# Patient Record
Sex: MALE | Race: BLACK OR AFRICAN AMERICAN | Employment: OTHER | URBAN - METROPOLITAN AREA
[De-identification: names, ages, dates, MRNs, and addresses within clinical notes are randomized per-mention and may not be internally consistent; named-entity substitution may affect disease eponyms.]

---

## 2019-06-05 ENCOUNTER — APPOINTMENT (OUTPATIENT)
Dept: GENERAL RADIOLOGY | Age: 69
End: 2019-06-05
Attending: EMERGENCY MEDICINE
Payer: MEDICARE

## 2019-06-05 ENCOUNTER — HOSPITAL ENCOUNTER (EMERGENCY)
Age: 69
Discharge: HOME | End: 2019-06-05
Attending: EMERGENCY MEDICINE
Payer: MEDICARE

## 2019-06-05 VITALS
SYSTOLIC BLOOD PRESSURE: 167 MMHG | RESPIRATION RATE: 18 BRPM | OXYGEN SATURATION: 98 % | BODY MASS INDEX: 28 KG/M2 | HEART RATE: 88 BPM | WEIGHT: 200 LBS | TEMPERATURE: 98.2 F | DIASTOLIC BLOOD PRESSURE: 94 MMHG | HEIGHT: 71 IN

## 2019-06-05 DIAGNOSIS — M79.675 GREAT TOE PAIN, LEFT: Primary | ICD-10-CM

## 2019-06-05 DIAGNOSIS — R03.0 ELEVATED BLOOD PRESSURE READING: ICD-10-CM

## 2019-06-05 DIAGNOSIS — S93.509A TOE SPRAIN, INITIAL ENCOUNTER: ICD-10-CM

## 2019-06-05 DIAGNOSIS — F17.200 NICOTINE USE DISORDER: ICD-10-CM

## 2019-06-05 DIAGNOSIS — S99.922A INJURY OF GREAT TOE OF LEFT FOOT, INITIAL ENCOUNTER: ICD-10-CM

## 2019-06-05 PROCEDURE — 74011250637 HC RX REV CODE- 250/637: Performed by: EMERGENCY MEDICINE

## 2019-06-05 PROCEDURE — 99283 EMERGENCY DEPT VISIT LOW MDM: CPT

## 2019-06-05 PROCEDURE — 77030036687 HC SHOE PSTOP S2SG -A

## 2019-06-05 PROCEDURE — 73630 X-RAY EXAM OF FOOT: CPT

## 2019-06-05 RX ORDER — BISMUTH SUBSALICYLATE 262 MG
1 TABLET,CHEWABLE ORAL DAILY
COMMUNITY
End: 2020-09-13

## 2019-06-05 RX ORDER — NAPROXEN 375 MG/1
375 TABLET ORAL 2 TIMES DAILY WITH MEALS
Qty: 10 TAB | Refills: 0 | Status: SHIPPED | OUTPATIENT
Start: 2019-06-05 | End: 2019-06-10

## 2019-06-05 RX ORDER — HYDROCODONE BITARTRATE AND ACETAMINOPHEN 5; 325 MG/1; MG/1
1 TABLET ORAL ONCE
Status: COMPLETED | OUTPATIENT
Start: 2019-06-05 | End: 2019-06-05

## 2019-06-05 RX ORDER — NAPROXEN 250 MG/1
500 TABLET ORAL
Status: COMPLETED | OUTPATIENT
Start: 2019-06-05 | End: 2019-06-05

## 2019-06-05 RX ORDER — TRAMADOL HYDROCHLORIDE 50 MG/1
50 TABLET ORAL
Qty: 12 TAB | Refills: 0 | Status: SHIPPED | OUTPATIENT
Start: 2019-06-05 | End: 2019-06-08

## 2019-06-05 RX ADMIN — NAPROXEN 500 MG: 250 TABLET ORAL at 08:24

## 2019-06-05 RX ADMIN — HYDROCODONE BITARTRATE AND ACETAMINOPHEN 1 TABLET: 5; 325 TABLET ORAL at 08:25

## 2019-06-05 NOTE — ED PROVIDER NOTES
EMERGENCY DEPARTMENT HISTORY AND PHYSICAL EXAM    7:18 AM      Date: 6/5/2019  Patient Name: Yvonne Farah    History of Presenting Illness     Chief Complaint   Patient presents with    Toe Pain         History Provided By: Patient    Additional History (Context): Yvonne Farah is a 80 y.o. male with Patient states has no past medical problems who presents with chief complaint of left great toe pain for 5 days. He states the pain is moderate. He states that after he hit the toe there was just minimal pain but the pain got increasingly worse. He reports hitting the toe on a cabinet about 5 days ago and is been having some limping since then. States the pain is 6 7 out of 10. He denies any history of gout, and also denies weakness, numbness, dizziness, shortness of breath, calf pain, other foot pain, and no other injury. No other complaints. He does report that he has no PCP and would like a referral.    PCP: None        Past History     Past Medical History:  Denies  Past Surgical History:  Denies  Family History:  History reviewed. No pertinent family history. Social History:  Social History     Tobacco Use    Smoking status: Current Every Day Smoker    Tobacco comment: 3 cig a day   Substance Use Topics    Alcohol use: Yes     Comment: 1/2 pint every 2-3 days    Drug use: Never       Allergies:  No Known Allergies      Review of Systems       Review of Systems   Constitutional: Negative for chills and fever. Eyes: Negative for visual disturbance. Respiratory: Negative for shortness of breath. Cardiovascular: Negative for chest pain and leg swelling. Gastrointestinal: Negative for nausea and vomiting. Musculoskeletal: Positive for arthralgias and gait problem (.  Limping). Left great toe pain   Skin: Negative for color change, pallor and rash. Neurological: Negative for dizziness, weakness and numbness. Hematological: Does not bruise/bleed easily.    Psychiatric/Behavioral: Negative for confusion. All other systems reviewed and are negative. Physical Exam     Visit Vitals  BP (!) 167/94 (BP 1 Location: Left arm, BP Patient Position: Sitting)   Pulse 88   Temp 98.2 °F (36.8 °C)   Resp 18   Ht 5' 11\" (1.803 m)   Wt 90.7 kg (200 lb)   SpO2 98%   BMI 27.89 kg/m²         Physical Exam   Constitutional: He is oriented to person, place, and time. He appears well-developed and well-nourished. No distress. HENT:   Head: Normocephalic and atraumatic. Eyes: Conjunctivae are normal.   Neck: Normal range of motion. Cardiovascular: Normal rate and intact distal pulses. Pulmonary/Chest: Effort normal. No respiratory distress. Musculoskeletal: Normal range of motion. He exhibits tenderness. He exhibits no edema. Tenderness left great toe  Some mild swelling of the toe  No erythema  No open wound  Sensation intact, can wiggle the toe but with pain limitation  No other foot or toe tenderness  No tib-fib tenderness  Good dorsalis pedal pulses  Sensation intact  Cap refill less than 2 seconds     Neurological: He is alert and oriented to person, place, and time. No cranial nerve deficit. He exhibits normal muscle tone. Coordination normal.   Skin: Skin is warm and dry. He is not diaphoretic. No erythema. No pallor. Psychiatric: He has a normal mood and affect. His behavior is normal.   Nursing note and vitals reviewed. Diagnostic Study Results     Labs -  No results found for this or any previous visit (from the past 12 hour(s)). Radiologic Studies -   XR FOOT LT MIN 3 V   Final Result   IMPRESSION:        1. No acute fracture or subluxation. 2.  Osteoarthrosis at the 1st metatarsophalangeal joint and the 1st sesamoid and   metatarsal joint. 3.  No definite erosive arthropathy. Medical Decision Making   I am the first provider for this patient.     I reviewed the vital signs, available nursing notes, past medical history, past surgical history, family history and social history. Vital Signs-Reviewed the patient's vital signs. Records Reviewed: Nursing Notes and Old Medical Records (Time of Review: 7:18 AM)    Provider Notes (Medical Decision Making): DDX: Fracture, sprain, internal derangement, gout, arthritis    Consistent with injury related  X-ray, pain medication      MDM    Medications   HYDROcodone-acetaminophen (NORCO) 5-325 mg per tablet 1 Tab (has no administration in time range)   naproxen (NAPROSYN) tablet 500 mg (has no administration in time range)           ED Course: Progress Notes, Reevaluation, and Consults:  I discussed patient's elevated blood pressure which could be associated with pain extremis. Denies any history of hypertension, but does not have a PCP and has not seen a doctor in a long time. He is asymptomatic, no dizziness, no chest pain no blurred vision. We will give him a PCP referral also podiatrist referral.    I discussed risk of nicotine use disorder including MI, stroke, cancer, and advised him to quit smoking. Patient states that he does not believe he has a problem with smoking, although does smoke daily. Advised him to discuss further with PCP referral.  Patient understands and agrees    I have reassessed the patient. I have discussed the workup, results and plan with the patient and patient is in agreement. Patient is feeling better. Patient will be prescribed tramadol, Naprosyn. Patient was discharge in stable condition. Patient was given outpatient follow up. Patient is to return to emergency department if any new or worsening condition. Diagnosis     Clinical Impression:   1. Great toe pain, left    2. Injury of great toe of left foot, initial encounter    3. Toe sprain, initial encounter    4. Elevated blood pressure reading    5.  Nicotine use disorder        Disposition: Discharged    Follow-up Information     Follow up With Specialties Details Why Contact Info    Elizabeth Castellano DPM Podiatry Schedule an appointment as soon as possible for a visit in 1 day  [address] [facility] [address] [facility]  Schedule an appointment as soon as possible for a visit in 2 days Get repeat blood pressure check [address]  [address] [facility]    [id number] [place] [address] EMERGENCY DEPT Emergency Medicine  As needed, If symptoms worsen [address] [facility]  [phone]           Patient's Medications   Start Taking    NAPROXEN (NAPROSYN) 375 MG TABLET    Take 1 Tab by mouth two (2) times daily (with meals) for 5 days. For pain    TRAMADOL (ULTRAM) 50 MG TABLET    Take 1 Tab by mouth every six (6) hours as needed for Pain for up to 3 days. Max Daily Amount: 200 mg. Indications: Pain   Continue Taking    MULTIVITAMIN (ONE A DAY) TABLET    Take 1 Tab by mouth daily. These Medications have changed    No medications on file   Stop Taking    No medications on file         DO Elodia Mac medical dictation software was used for portions of this report. Unintended transcription errors may occur. My signature above authenticates this document and my orders, the final    diagnosis (es), discharge prescription (s), and instructions in the Epic    record.

## 2019-06-05 NOTE — DISCHARGE INSTRUCTIONS
If you were prescribed any medication take as directed. Do not drive or use heavy equipment if prescribed narcotics. Follow up with your primary care physician or with specialist as directed. Return to the emergency room with any new or worsening conditions. Foot Sprain: Care Instructions  Your Care Instructions    A foot sprain occurs when you stretch or tear the ligaments around your foot. Ligaments are the tough tissues that connect one bone to another. A sprain can happen when you run, fall, or hit your toe against something. Sprains often happen when you jump or change direction quickly. This may occur when you play basketball, soccer, or other sports. Most foot sprains will get better with treatment at home. Follow-up care is a key part of your treatment and safety. Be sure to make and go to all appointments, and call your doctor if you are having problems. It's also a good idea to know your test results and keep a list of the medicines you take. How can you care for yourself at home? · Walk or put weight on your sprained foot as long as it does not hurt. · If your doctor gave you a splint or immobilizer, wear it as directed. If you were given crutches, use them as directed. · For the first 2 days after your injury, avoid hot showers, hot tubs, or hot packs. They may increase swelling. · Put ice or a cold pack on your foot for 10 to 20 minutes at a time to stop swelling. Try this every 1 to 2 hours for 3 days (when you are awake) or until the swelling goes down. Put a thin cloth between the ice pack and your skin. Keep your splint dry. · After 2 or 3 days, if your swelling is gone, put a heating pad (set on low) or a warm cloth on your foot. Some doctors suggest that you go back and forth between hot and cold treatments. · Prop up your foot on a pillow when you ice it or anytime you sit or lie down. Try to keep it above the level of your heart. This will help reduce swelling.   · Take pain medicines exactly as directed. ? If the doctor gave you a prescription medicine for pain, take it as prescribed. ? If you are not taking a prescription pain medicine, ask your doctor if you can take an over-the-counter medicine. · Do any exercises that your doctor or physical therapist suggests. · Return to your usual exercise gradually as you feel better. When should you call for help? Call your doctor now or seek immediate medical care if:    · You have increased or severe pain.     · Your toes are cool or pale or change color.     · Your wrap or splint feels too tight.     · You have signs of a blood clot, such as:  ? Pain in your calf, back of the knee, thigh, or groin. ? Redness and swelling in your leg or groin.     · You have tingling, weakness, or numbness in your leg or foot.    Watch closely for changes in your health, and be sure to contact your doctor if:    · You cannot put any weight on your foot.     · You get a fever.     · You do not get better as expected. Where can you learn more? Go to http://theresa-leann.info/. Enter Y688 in the search box to learn more about \"Foot Sprain: Care Instructions. \"  Current as of: September 20, 2018  Content Version: 11.9  © 2348-6307 [employer]. Care instructions adapted under license by [employer] (which disclaims liability or warranty for this information). If you have questions about a medical condition or this instruction, always ask your healthcare professional. [place] [address] any warranty or liability for your use of this information. Elevated Blood Pressure: Care Instructions  Your Care Instructions    Blood pressure is a measure of how hard the blood pushes against the walls of your arteries. It's normal for blood pressure to go up and down throughout the day. But if it stays up over time, you have high blood pressure. Two numbers tell you your blood pressure.  The first number is the systolic pressure. It shows how hard the blood pushes when your heart is pumping. The second number is the diastolic pressure. It shows how hard the blood pushes between heartbeats, when your heart is relaxed and filling with blood. An ideal blood pressure in adults is less than 120/80 (say \"120 over 80\"). High blood pressure is 140/90 or higher. You have high blood pressure if your top number is 140 or higher or your bottom number is 90 or higher, or both. The main test for high blood pressure is simple, fast, and painless. To diagnose high blood pressure, your doctor will test your blood pressure at different times. After testing your blood pressure, your doctor may ask you to test it again when you are home. If you are diagnosed with high blood pressure, you can work with your doctor to make a long-term plan to manage it. Follow-up care is a key part of your treatment and safety. Be sure to make and go to all appointments, and call your doctor if you are having problems. It's also a good idea to know your test results and keep a list of the medicines you take. How can you care for yourself at home? · Do not smoke. Smoking increases your risk for heart attack and stroke. If you need help quitting, talk to your doctor about stop-smoking programs and medicines. These can increase your chances of quitting for good. · Stay at a healthy weight. · Try to limit how much sodium you eat to less than 2,300 milligrams (mg) a day. Your doctor may ask you to try to eat less than 1,500 mg a day. · Be physically active. Get at least 30 minutes of exercise on most days of the week. Walking is a good choice. You also may want to do other activities, such as running, swimming, cycling, or playing tennis or team sports. · Avoid or limit alcohol. Talk to your doctor about whether you can drink any alcohol. · Eat plenty of fruits, vegetables, and low-fat dairy products.  Eat less saturated and total fats.  · Learn how to check your blood pressure at home. When should you call for help? Call your doctor now or seek immediate medical care if:  ? · Your blood pressure is much higher than normal (such as 180/110 or higher). ? · You think high blood pressure is causing symptoms such as:  ¨ Severe headache. ¨ Blurry vision. ? Watch closely for changes in your health, and be sure to contact your doctor if:  ? · You do not get better as expected. Where can you learn more? Go to http://theresa-leann.info/. Enter Y188 in the search box to learn more about \"Elevated Blood Pressure: Care Instructions. \"  Current as of: September 21, 2016  Content Version: 11.4  © 0471-5327 [employer]. Care instructions adapted under license by [employer] (which disclaims liability or warranty for this information). If you have questions about a medical condition or this instruction, always ask your healthcare professional. [place] [address] any warranty or liability for your use of this information. Patient Education        Foot Pain: Care Instructions  Your Care Instructions  Foot injuries that cause pain and swelling are fairly common. Almost all sports or home repair projects can cause a misstep that ends up as foot pain. Normal wear and tear, especially as you get older, also can cause foot pain. Most minor foot injuries will heal on their own, and home treatment is usually all you need to do. If you have a severe injury, you may need tests and treatment. Follow-up care is a key part of your treatment and safety. Be sure to make and go to all appointments, and call your doctor if you are having problems. It's also a good idea to know your test results and keep a list of the medicines you take. How can you care for yourself at home? · Take pain medicines exactly as directed. ? If the doctor gave you a prescription medicine for pain, take it as prescribed. ?  If you are not taking a prescription pain medicine, ask your doctor if you can take an over-the-counter medicine. · Rest and protect your foot. Take a break from any activity that may cause pain. · Put ice or a cold pack on your foot for 10 to 20 minutes at a time. Put a thin cloth between the ice and your skin. · Prop up the sore foot on a pillow when you ice it or anytime you sit or lie down during the next 3 days. Try to keep it above the level of your heart. This will help reduce swelling. · Your doctor may recommend that you wrap your foot with an elastic bandage. Keep your foot wrapped for as long as your doctor advises. · If your doctor recommends crutches, use them as directed. · Wear roomy footwear. · As soon as pain and swelling end, begin gentle exercises of your foot. Your doctor can tell you which exercises will help. When should you call for help? Call 911 anytime you think you may need emergency care. For example, call if:    · Your foot turns pale, white, blue, or cold.    Call your doctor now or seek immediate medical care if:    · You cannot move or stand on your foot.     · Your foot looks twisted or out of its normal position.     · Your foot is not stable when you step down.     · You have signs of infection, such as:  ? Increased pain, swelling, warmth, or redness. ? Red streaks leading from the sore area. ? Pus draining from a place on your foot. ? A fever.     · Your foot is numb or tingly.    Watch closely for changes in your health, and be sure to contact your doctor if:    · You do not get better as expected.     · You have bruises from an injury that last longer than 2 weeks. Where can you learn more? Go to http://theresa-leann.info/. Enter M679 in the search box to learn more about \"Foot Pain: Care Instructions. \"  Current as of: September 20, 2018  Content Version: 11.9  © 7381-7540 [employer].  Care instructions adapted under license by [employer] (which disclaims liability or warranty for this information). If you have questions about a medical condition or this instruction, always ask your healthcare professional. [address] any warranty or liability for your use of this information.

## 2020-09-13 ENCOUNTER — HOSPITAL ENCOUNTER (EMERGENCY)
Age: 70
Discharge: HOME | End: 2020-09-13
Attending: EMERGENCY MEDICINE

## 2020-09-13 VITALS
RESPIRATION RATE: 16 BRPM | TEMPERATURE: 98.2 F | HEART RATE: 89 BPM | SYSTOLIC BLOOD PRESSURE: 168 MMHG | DIASTOLIC BLOOD PRESSURE: 97 MMHG | OXYGEN SATURATION: 98 %

## 2020-09-13 DIAGNOSIS — L23.7 CONTACT DERMATITIS DUE TO POISON IVY: Primary | ICD-10-CM

## 2020-09-13 PROCEDURE — 99282 EMERGENCY DEPT VISIT SF MDM: CPT

## 2020-09-13 RX ORDER — TRIAMCINOLONE ACETONIDE 1 MG/G
OINTMENT TOPICAL 2 TIMES DAILY
Qty: 30 G | Refills: 0 | Status: SHIPPED | OUTPATIENT
Start: 2020-09-13 | End: 2022-06-29

## 2020-09-13 NOTE — ED PROVIDER NOTES
HPI patient says he thinks he got into some poison ivy yesterday when he was cutting the grass. He complains of a pruritic rash on the left inner forearm and behind his neck. He says he is had this before when he got into poison ivy and this feels similar. No other complaints given at this time. History reviewed. No pertinent past medical history. History reviewed. No pertinent surgical history. History reviewed. No pertinent family history. Social History     Socioeconomic History    Marital status: SINGLE     Spouse name: Not on file    Number of children: Not on file    Years of education: Not on file    Highest education level: Not on file   Occupational History    Not on file   Social Needs    Financial resource strain: Not on file    Food insecurity     Worry: Not on file     Inability: Not on file    Transportation needs     Medical: Not on file     Non-medical: Not on file   Tobacco Use    Smoking status: Current Every Day Smoker    Tobacco comment: 3 cig a day   Substance and Sexual Activity    Alcohol use: Yes     Comment: 1/2 pint every 2-3 days    Drug use: Never    Sexual activity: Not on file   Lifestyle    Physical activity     Days per week: Not on file     Minutes per session: Not on file    Stress: Not on file   Relationships    Social connections     Talks on phone: Not on file     Gets together: Not on file     Attends [employer] service: Not on file     Active member of club or organization: Not on file     Attends meetings of clubs or organizations: Not on file     Relationship status: Not on file    Intimate partner violence     Fear of current or ex partner: Not on file     Emotionally abused: Not on file     Physically abused: Not on file     Forced sexual activity: Not on file   Other Topics Concern    Not on file   Social History Narrative    Not on file         ALLERGIES: Patient has no known allergies. Review of Systems   Constitutional: Negative. HENT: Negative. Respiratory: Negative. Cardiovascular: Negative. Gastrointestinal: Negative. Genitourinary: Negative. Skin: Positive for rash. Neurological: Negative. Psychiatric/Behavioral: Negative. Vitals:    09/13/20 0936   BP: (!) 168/97   Pulse: 89   Resp: 16   Temp: 98.2 °F (36.8 °C)   SpO2: 98%            Physical Exam  Vitals signs and nursing note reviewed. Constitutional:       General: He is not in acute distress. Appearance: He is well-developed. He is not diaphoretic. HENT:      Head: Normocephalic and atraumatic. Eyes:      Pupils: Pupils are equal, round, and reactive to light. Neck:      Musculoskeletal: Normal range of motion and neck supple. Cardiovascular:      Rate and Rhythm: Normal rate and regular rhythm. Heart sounds: Normal heart sounds. No murmur. No friction rub. No gallop. Pulmonary:      Effort: Pulmonary effort is normal. No respiratory distress. Breath sounds: Normal breath sounds. No wheezing or rales. Musculoskeletal: Normal range of motion. Skin:     General: Skin is warm and dry. Findings: Rash present. Comments: Pruritic linear papules on the back of the exposed area of the neck and on the volar aspect of the left forearm consistent with contact poison ivy   Neurological:      Mental Status: He is alert and oriented to person, place, and time.           MDM       Procedures

## 2020-09-13 NOTE — DISCHARGE INSTRUCTIONS
Patient Education        Poison ALETHEA[place]RIT[place]AN, [place], and [place]: Care Instructions  Your Care Instructions     Poison ivy, poison oak, and poison sumac are plants that can cause a skin rash upon contact. The red, itchy rash often shows up in lines or streaks and may cause fluid-filled blisters or large, raised hives. The rash is caused by an allergic reaction to an oil in poison ivy, oak, and sumac. The rash may occur when you touch the plant or when you touch clothing, pet fur, sporting gear, gardening tools, or other objects that have come in contact with one of these plants. You cannot catch or spread the rash, even if you touch it or the blister fluid, because the plant oil will already have been absorbed or washed off the skin. The rash may seem to be spreading, but either it is still developing from earlier contact or you have touched something that still has the plant oil on it. Follow-up care is a key part of your treatment and safety. Be sure to make and go to all appointments, and call your doctor if you are having problems. It's also a good idea to know your test results and keep a list of the medicines you take. How can you care for yourself at home? · If your doctor prescribed a cream, use it as directed. If your doctor prescribed medicine, take it exactly as prescribed. Call your doctor if you think you are having a problem with your medicine. · Use cold, wet cloths to reduce itching. · Keep cool, and stay out of the sun. · Leave the rash open to the air. · Wash all clothing or other things that may have come in contact with the plant oil. · Avoid most lotions and ointments until the rash heals. Calamine lotion may help relieve symptoms of a plant rash. Use it 3 or 4 times a day. To prevent poison ivy exposure  If you know that you will be near poison ivy, oak, or sumac, you can try these options:  · Use a product designed to help prevent plant oil from getting on the skin.  These products, such as Ivy X Pre-Contact Skin Solution, come in lotions, sprays, or towelettes. You put the product on your skin right before you go outdoors. · If you did not use a preventive product and you have had contact with plant oil, clean it off your skin as soon as possible. Use a product such as [employer]ec[employer] Original Outdoor Skin Cleanser. These products can also be used to clean plant oil from clothing or tools. When should you call for help? Call your doctor now or seek immediate medical care if:    · Your rash gets worse, and you start to feel bad and have a fever, a stiff neck, nausea, and vomiting.     · You have signs of infection, such as:  ? Increased pain, swelling, warmth, or redness. ? Red streaks leading from the rash. ? Pus draining from the rash. ? A fever. Watch closely for changes in your health, and be sure to contact your doctor if:    · You have new blisters or bruises, or the rash spreads and looks like a sunburn.     · The rash gets worse, or it comes back after nearly disappearing.     · You think a medicine you are using is making your rash worse.     · Your rash does not clear up after 1 to 2 weeks of home treatment.     · You have joint aches or body aches with your rash. Where can you learn more? Go to http://theresa-leann.info/  Enter O650 in the search box to learn more about \"Poison ALETHEA-LEDY, [place], and [place]: Care Instructions. \"  Current as of: July 2, 2020               Content Version: 12.6  © 2006-2020 [employer]. Care instructions adapted under license by [employer] (which disclaims liability or warranty for this information). If you have questions about a medical condition or this instruction, always ask your healthcare professional. [place] [address] any warranty or liability for your use of this information.

## 2021-04-19 ENCOUNTER — HOSPITAL ENCOUNTER (EMERGENCY)
Age: 71
Discharge: HOME | End: 2021-04-19
Attending: EMERGENCY MEDICINE

## 2021-04-19 ENCOUNTER — APPOINTMENT (OUTPATIENT)
Dept: GENERAL RADIOLOGY | Age: 71
End: 2021-04-19
Attending: EMERGENCY MEDICINE

## 2021-04-19 VITALS
TEMPERATURE: 98.7 F | HEART RATE: 100 BPM | DIASTOLIC BLOOD PRESSURE: 95 MMHG | SYSTOLIC BLOOD PRESSURE: 186 MMHG | HEIGHT: 71 IN | RESPIRATION RATE: 16 BRPM | BODY MASS INDEX: 27.89 KG/M2 | OXYGEN SATURATION: 100 %

## 2021-04-19 DIAGNOSIS — M79.671 RIGHT FOOT PAIN: Primary | ICD-10-CM

## 2021-04-19 DIAGNOSIS — I10 HYPERTENSION, UNSPECIFIED TYPE: ICD-10-CM

## 2021-04-19 DIAGNOSIS — M10.9 ACUTE GOUT OF RIGHT FOOT, UNSPECIFIED CAUSE: ICD-10-CM

## 2021-04-19 LAB
ANION GAP SERPL CALC-SCNC: 6 MMOL/L (ref 3–18)
BASOPHILS # BLD: 0 K/UL (ref 0–0.1)
BASOPHILS NFR BLD: 1 % (ref 0–2)
BUN SERPL-MCNC: 19 MG/DL (ref 7–18)
BUN/CREAT SERPL: 13 (ref 12–20)
CALCIUM SERPL-MCNC: 9 MG/DL (ref 8.5–10.1)
CHLORIDE SERPL-SCNC: 107 MMOL/L (ref 100–111)
CO2 SERPL-SCNC: 27 MMOL/L (ref 21–32)
CREAT SERPL-MCNC: 1.42 MG/DL (ref 0.6–1.3)
DIFFERENTIAL METHOD BLD: ABNORMAL
EOSINOPHIL # BLD: 0.4 K/UL (ref 0–0.4)
EOSINOPHIL NFR BLD: 4 % (ref 0–5)
ERYTHROCYTE [DISTWIDTH] IN BLOOD BY AUTOMATED COUNT: 15.1 % (ref 11.6–14.5)
GLUCOSE SERPL-MCNC: 117 MG/DL (ref 74–99)
HCT VFR BLD AUTO: 43 % (ref 36–48)
HGB BLD-MCNC: 14.2 G/DL (ref 13–16)
LYMPHOCYTES # BLD: 2.8 K/UL (ref 0.9–3.6)
LYMPHOCYTES NFR BLD: 35 % (ref 21–52)
MCH RBC QN AUTO: 28.1 PG (ref 24–34)
MCHC RBC AUTO-ENTMCNC: 33 G/DL (ref 31–37)
MCV RBC AUTO: 85.1 FL (ref 74–97)
MONOCYTES # BLD: 0.8 K/UL (ref 0.05–1.2)
MONOCYTES NFR BLD: 10 % (ref 3–10)
NEUTS SEG # BLD: 4 K/UL (ref 1.8–8)
NEUTS SEG NFR BLD: 50 % (ref 40–73)
PLATELET # BLD AUTO: 392 K/UL (ref 135–420)
PMV BLD AUTO: 9 FL (ref 9.2–11.8)
POTASSIUM SERPL-SCNC: 4.2 MMOL/L (ref 3.5–5.5)
RBC # BLD AUTO: 5.05 M/UL (ref 4.35–5.65)
SODIUM SERPL-SCNC: 140 MMOL/L (ref 136–145)
URATE SERPL-MCNC: 7.9 MG/DL (ref 2.6–7.2)
WBC # BLD AUTO: 8 K/UL (ref 4.6–13.2)

## 2021-04-19 PROCEDURE — 74011250637 HC RX REV CODE- 250/637: Performed by: EMERGENCY MEDICINE

## 2021-04-19 PROCEDURE — 80048 BASIC METABOLIC PNL TOTAL CA: CPT

## 2021-04-19 PROCEDURE — 84550 ASSAY OF BLOOD/URIC ACID: CPT

## 2021-04-19 PROCEDURE — 73630 X-RAY EXAM OF FOOT: CPT

## 2021-04-19 PROCEDURE — 85025 COMPLETE CBC W/AUTO DIFF WBC: CPT

## 2021-04-19 PROCEDURE — 99283 EMERGENCY DEPT VISIT LOW MDM: CPT

## 2021-04-19 RX ORDER — IBUPROFEN 600 MG/1
600 TABLET ORAL
Status: COMPLETED | OUTPATIENT
Start: 2021-04-19 | End: 2021-04-19

## 2021-04-19 RX ORDER — IBUPROFEN 400 MG/1
400 TABLET ORAL
Qty: 20 TAB | Refills: 0 | Status: SHIPPED | OUTPATIENT
Start: 2021-04-19 | End: 2022-06-29

## 2021-04-19 RX ORDER — OXYCODONE AND ACETAMINOPHEN 5; 325 MG/1; MG/1
1 TABLET ORAL
Qty: 16 TAB | Refills: 0 | Status: SHIPPED | OUTPATIENT
Start: 2021-04-19 | End: 2021-04-26

## 2021-04-19 RX ADMIN — IBUPROFEN 600 MG: 600 TABLET ORAL at 21:24

## 2021-04-20 NOTE — ED PROVIDER NOTES
Pt c/o rt foot pain, x 3 days. Says h/o same in past but never this bad. No fever, no cough. No sob. No nausea. No injury. Says h/o fx in same area 3 yrs ago. No meds for sx's pta. elver nmoderate, severe to touch or walking. No other area of leg pain inc no ankle/calf/thigh pain. No sob or chest pain. No past medical history on file. No past surgical history on file. No family history on file. Social History     Socioeconomic History    Marital status: SINGLE     Spouse name: Not on file    Number of children: Not on file    Years of education: Not on file    Highest education level: Not on file   Occupational History    Not on file   Social Needs    Financial resource strain: Not on file    Food insecurity     Worry: Not on file     Inability: Not on file    Transportation needs     Medical: Not on file     Non-medical: Not on file   Tobacco Use    Smoking status: Current Every Day Smoker    Tobacco comment: 3 cig a day   Substance and Sexual Activity    Alcohol use: Yes     Comment: 1/2 pint every 2-3 days    Drug use: Never    Sexual activity: Not on file   Lifestyle    Physical activity     Days per week: Not on file     Minutes per session: Not on file    Stress: Not on file   Relationships    Social connections     Talks on phone: Not on file     Gets together: Not on file     Attends Buddhist service: Not on file     Active member of club or organization: Not on file     Attends meetings of clubs or organizations: Not on file     Relationship status: Not on file    Intimate partner violence     Fear of current or ex partner: Not on file     Emotionally abused: Not on file     Physically abused: Not on file     Forced sexual activity: Not on file   Other Topics Concern    Not on file   Social History Narrative    Not on file         ALLERGIES: Patient has no known allergies. Review of Systems   Constitutional: Negative for fever.    Gastrointestinal: Negative for nausea. Skin: Negative for wound. Neurological: Negative for weakness and numbness. All other systems reviewed and are negative. Vitals:    04/19/21 1925   BP: (!) 186/95   Pulse: 100   Resp: 16   Temp: 98.7 °F (37.1 °C)   SpO2: 100%   Height: 5' 11\" (1.803 m)            Physical Exam  Vitals signs and nursing note reviewed. Constitutional:       Appearance: He is well-developed. HENT:      Head: Normocephalic and atraumatic. Eyes:      Conjunctiva/sclera: Conjunctivae normal.   Neck:      Musculoskeletal: Normal range of motion. Cardiovascular:      Rate and Rhythm: Normal rate. Pulses: Normal pulses. Pulmonary:      Effort: Pulmonary effort is normal.   Abdominal:      Palpations: Abdomen is soft. Tenderness: There is no abdominal tenderness. Musculoskeletal:         General: Tenderness (+ rt foot ttp over 1st mtp, 1st toe. ) present. Skin:     General: Skin is warm and dry. Capillary Refill: Capillary refill takes less than 2 seconds. Findings: No rash. Neurological:      Mental Status: He is alert and oriented to person, place, and time.           MDM       Procedures    Vitals:  Patient Vitals for the past 12 hrs:   Temp Pulse Resp BP SpO2   04/19/21 1925 98.7 °F (37.1 °C) 100 16 (!) 186/95 100 %         Medications ordered:   Medications   ibuprofen (MOTRIN) tablet 600 mg (600 mg Oral Given 4/19/21 2124)         Lab findings:  Recent Results (from the past 12 hour(s))   URIC ACID    Collection Time: 04/19/21  9:28 PM   Result Value Ref Range    Uric acid 7.9 (H) 2.6 - 7.2 MG/DL   CBC WITH AUTOMATED DIFF    Collection Time: 04/19/21  9:28 PM   Result Value Ref Range    WBC 8.0 4.6 - 13.2 K/uL    RBC 5.05 4.35 - 5.65 M/uL    HGB 14.2 13.0 - 16.0 g/dL    HCT 43.0 36.0 - 48.0 %    MCV 85.1 74.0 - 97.0 FL    MCH 28.1 24.0 - 34.0 PG    MCHC 33.0 31.0 - 37.0 g/dL    RDW 15.1 (H) 11.6 - 14.5 %    PLATELET 565 113 - 139 K/uL    MPV 9.0 (L) 9.2 - 11.8 FL    NEUTROPHILS 50 40 - 73 %    LYMPHOCYTES 35 21 - 52 %    MONOCYTES 10 3 - 10 %    EOSINOPHILS 4 0 - 5 %    BASOPHILS 1 0 - 2 %    ABS. NEUTROPHILS 4.0 1.8 - 8.0 K/UL    ABS. LYMPHOCYTES 2.8 0.9 - 3.6 K/UL    ABS. MONOCYTES 0.8 0.05 - 1.2 K/UL    ABS. EOSINOPHILS 0.4 0.0 - 0.4 K/UL    ABS. BASOPHILS 0.0 0.0 - 0.1 K/UL    DF AUTOMATED     METABOLIC PANEL, BASIC    Collection Time: 04/19/21  9:28 PM   Result Value Ref Range    Sodium 140 136 - 145 mmol/L    Potassium 4.2 3.5 - 5.5 mmol/L    Chloride 107 100 - 111 mmol/L    CO2 27 21 - 32 mmol/L    Anion gap 6 3.0 - 18 mmol/L    Glucose 117 (H) 74 - 99 mg/dL    BUN 19 (H) 7.0 - 18 MG/DL    Creatinine 1.42 (H) 0.6 - 1.3 MG/DL    BUN/Creatinine ratio 13 12 - 20      GFR est AA 60 (L) >60 ml/min/1.73m2    GFR est non-AA 49 (L) >60 ml/min/1.73m2    Calcium 9.0 8.5 - 10.1 MG/DL           X-Ray, CT or other radiology findings or impressions:  XR FOOT RT MIN 3 V    (Results Pending)       Progress notes, Consult notes or additional Procedure notes:   10:50 PM pt feels better. No emc. Not c/w fx/infection/abscess/cellulitis/dvt. Stable for dc and close f/u. Detailed ret inst given. Diagnosis:   1. Right foot pain    2. Acute gout of right foot, unspecified cause    3.  Hypertension, unspecified type        Disposition: home    Follow-up Information     Follow up With Specialties Details Why Contact Info    [facility] Emergency Medicine Go to  As needed [address] [phone]  [address] [facility]  Schedule an appointment as soon as possible for a visit in 2 days or your family physician Julio Cesar Linares 3  [facility] [address] YARON Franco MD Orthopedic Surgery Schedule an appointment as soon as possible for a visit in 2 days  [address] [address] [phone]             Patient's Medications   Start Taking    No medications on file   Continue Taking TRIAMCINOLONE ACETONIDE (KENALOG) 0.1 % OINTMENT    Apply  to affected area two (2) times a day.  use thin layer   These Medications have changed    No medications on file   Stop Taking    No medications on file

## 2021-04-20 NOTE — ED NOTES
Assumed care for discharge only. Written and verbal discharge instructions given. Patient verbalizes understanding of same. Patient denies  further questions about treatment and discharge instructions. Left ED with patent airway and steady gait. Arm band removed shredded. Patient left ED with RX to obtain and take as directed when he picks up. Copy of gout diet given and reviewed with patient.     COVID WARNINGS GIVEN AND DIRE IMPORTANCE OF FOLLOWING GUIDELINES STRESSED

## 2022-06-29 ENCOUNTER — HOSPITAL ENCOUNTER (EMERGENCY)
Age: 72
Discharge: HOME | End: 2022-06-29
Attending: STUDENT IN AN ORGANIZED HEALTH CARE EDUCATION/TRAINING PROGRAM

## 2022-06-29 VITALS
WEIGHT: 240 LBS | HEIGHT: 71 IN | RESPIRATION RATE: 18 BRPM | HEART RATE: 86 BPM | OXYGEN SATURATION: 97 % | DIASTOLIC BLOOD PRESSURE: 89 MMHG | SYSTOLIC BLOOD PRESSURE: 159 MMHG | TEMPERATURE: 97.5 F | BODY MASS INDEX: 33.6 KG/M2

## 2022-06-29 DIAGNOSIS — L23.7 POISON IVY DERMATITIS: Primary | ICD-10-CM

## 2022-06-29 PROCEDURE — 74011250637 HC RX REV CODE- 250/637: Performed by: STUDENT IN AN ORGANIZED HEALTH CARE EDUCATION/TRAINING PROGRAM

## 2022-06-29 PROCEDURE — 99283 EMERGENCY DEPT VISIT LOW MDM: CPT

## 2022-06-29 RX ORDER — PREDNISONE 50 MG/1
50 TABLET ORAL DAILY
Qty: 3 TABLET | Refills: 0 | Status: SHIPPED | OUTPATIENT
Start: 2022-06-29 | End: 2022-07-02

## 2022-06-29 RX ORDER — HYDROXYZINE 50 MG/1
25 TABLET, FILM COATED ORAL
Qty: 20 TABLET | Refills: 0 | Status: SHIPPED | OUTPATIENT
Start: 2022-06-29 | End: 2022-07-09

## 2022-06-29 RX ORDER — HYDROXYZINE 25 MG/1
25 TABLET, FILM COATED ORAL
Status: DISCONTINUED | OUTPATIENT
Start: 2022-06-29 | End: 2022-06-29

## 2022-06-29 RX ORDER — HYDROXYZINE PAMOATE 25 MG/1
25 CAPSULE ORAL ONCE
Status: COMPLETED | OUTPATIENT
Start: 2022-06-29 | End: 2022-06-29

## 2022-06-29 RX ADMIN — HYDROXYZINE PAMOATE 25 MG: 25 CAPSULE ORAL at 05:52

## 2022-06-29 NOTE — ED PROVIDER NOTES
EMERGENCY DEPARTMENT HISTORY AND PHYSICAL EXAM    5:41 AM    Date: 6/29/2022  Patient Name: Lianna Calvillo    History of Presenting Illness     Chief Complaint   Patient presents with    Rash       History Provided By: Patient  Location/Duration/Severity/Modifying factors   HPI  Lianna Calvillo is a 80 y.o. male with no past medical problems presenting for evaluation of a rash. He says that about a week ago he was in his yard removing some brush and thinks that he pressed against poison ivy. He has a itchy rash on his left forearm that has been present for about 5 or 6 days. He has applied calamine lotion and topical cortisone which he says helps with his symptoms. He denies any fevers or chills. No rash on his mucous membranes or anywhere else. No new medications, change in diet or changes in lotions. No other alleviating or aggravating factors. Overall symptoms are mild to moderate. PCP: None    Current Facility-Administered Medications   Medication Dose Route Frequency Provider Last Rate Last Admin    hydrOXYzine pamoate (VISTARIL) capsule 25 mg  25 mg Oral ONCE Citlali Powell MD         Current Outpatient Medications   Medication Sig Dispense Refill    hydrOXYzine HCL (ATARAX) 50 mg tablet Take 0.5 Tablets by mouth every six (6) hours as needed for Itching for up to 10 days. 20 Tablet 0    predniSONE (DELTASONE) 50 mg tablet Take 1 Tablet by mouth daily for 3 days. 3 Tablet 0       Past History     Past Medical History:  History reviewed. No pertinent past medical history. Past Surgical History:  History reviewed. No pertinent surgical history. Family History:  History reviewed. No pertinent family history.     Social History:  Social History     Tobacco Use    Smoking status: Current Every Day Smoker     Packs/day: 0.25    Smokeless tobacco: Never Used    Tobacco comment: 3 cig a day   Substance Use Topics    Alcohol use: Yes     Comment: 1/2 pint every 2-3 days    Drug use: Yes     Types: Marijuana       Allergies:  No Known Allergies    I reviewed and confirmed the above information with patient and updated as necessary. Review of Systems     Review of Systems   Constitutional: Negative for diaphoresis and fever. HENT: Negative for ear pain and sore throat. Eyes:        No acute change in vision   Respiratory: Negative for cough and shortness of breath. Cardiovascular: Negative for chest pain and leg swelling. Gastrointestinal: Negative for abdominal pain and vomiting. Genitourinary: Negative for dysuria. Musculoskeletal: Negative for neck pain. Skin: Positive for rash. Negative for wound. Neurological: Negative for weakness and headaches. Physical Exam     Visit Vitals  BP (!) 159/89 (BP 1 Location: Left arm, BP Patient Position: At rest)   Pulse 86   Temp 97.5 °F (36.4 °C)   Resp 18   Ht 5' 11\" (1.803 m)   Wt 108.9 kg (240 lb)   SpO2 97%   BMI 33.47 kg/m²       Physical Exam  Vitals and nursing note reviewed. Constitutional:       Appearance: Normal appearance. He is not ill-appearing. HENT:      Mouth/Throat:      Mouth: Mucous membranes are moist.   Eyes:      Pupils: Pupils are equal, round, and reactive to light. Cardiovascular:      Rate and Rhythm: Normal rate and regular rhythm. Pulses: Normal pulses. Heart sounds: Normal heart sounds. Pulmonary:      Effort: Pulmonary effort is normal.      Breath sounds: Normal breath sounds. Abdominal:      General: Abdomen is flat. Tenderness: There is no abdominal tenderness. Musculoskeletal:         General: No swelling. Normal range of motion. Cervical back: Normal range of motion. Skin:     General: Skin is warm. Findings: Rash (Patch of a vesicular rash on left forearm approximately 4 cm x 3 cm, no erythema) present. Neurological:      General: No focal deficit present. Mental Status: He is alert and oriented to person, place, and time.          Diagnostic Study Results Labs -  No results found for this or any previous visit (from the past 24 hour(s)). Radiologic Studies -   No orders to display           Medical Decision Making   I am the first provider for this patient. I reviewed the vital signs, available nursing notes, past medical history, past surgical history, family history and social history. Vital Signs-Reviewed the patient's vital signs. Records Reviewed: Nursing Notes and Old Medical Records (Time of Review: 5:41 AM)    Provider Notes (Medical Decision Making):   MDM  81-year-old male here with rash on his arm consider likely poison ivy dermatitis, consider also poison oak or poison sumac exposure, contact dermatitis, no evidence of severe rash such as Patel-Rashard's. ED Course: Progress Notes, Reevaluation, and Consults:  Patient has afebrile and hemodynamically normal  Clinically consistent with a poison ivy rash, will treat with a dose of Vistaril here, short course of steroids at the patient's request and Atarax for his symptoms. He will continue calamine lotion, encouraged to do oatmeal baths, cool compresses. Expectant management discussed, signs and symptoms prompting return to the ED also discussed. He was discharged home in stable condition. Procedures    Diagnosis     Clinical Impression:   1. Poison ivy dermatitis      Disposition: Home    Follow-up Information     Follow up With Specialties Details Why [facility] EMERGENCY DEPT Emergency Medicine  As needed, If symptoms worsen [address] [phone]  [phone]           Patient's Medications   Start Taking    HYDROXYZINE HCL (ATARAX) 50 MG TABLET    Take 0.5 Tablets by mouth every six (6) hours as needed for Itching for up to 10 days. PREDNISONE (DELTASONE) 50 MG TABLET    Take 1 Tablet by mouth daily for 3 days.    Continue Taking    No medications on file   These Medications have changed    No medications on file   Stop Taking IBUPROFEN (MOTRIN) 400 MG TABLET    Take 1 Tab by mouth every six (6) hours as needed for Pain. TRIAMCINOLONE ACETONIDE (KENALOG) 0.1 % OINTMENT    Apply  to affected area two (2) times a day. use thin layer       Saurabh Garcia MD   Emergency Medicine   June 29, 2022, 5:41 AM     This note is dictated utilizing Dragon voice recognition software. Unfortunately this leads to occasional typographical errors using the voice recognition. I apologize in advance if the situation occurs. If questions occur please do not hesitate to contact me directly.     Randi Mayers MD

## 2022-06-29 NOTE — DISCHARGE INSTRUCTIONS
Please return to the ER with any new or worsening symptoms or any other concerns. Please return to the Emergency Department if you develop a fever, chills, cannot eat or drink due to nausea or vomiting, or if any of your symptoms worsen. Also, It is extremely important that you follow-up with a primary care physician and if you do not have one currently use the contact information provided to obtain an appointment. If none was provided please call the number on the back of your insurance card to locate a Primary care doctor. Many offices have \"cancellation lists\" that you can ask to be placed on; should a patient with an earlier appointment cancel you will be notified to take their place. Please return to the Emergency Room immediately if your symptoms worsen. Please carefully read all discharge instructions    [email or web address]    What are GoodRx coupons? GoodRx coupons will help you pay less than the cash price for your prescription. Deepak Leak free to use and are accepted at virtually every [place] pharmacy. Your pharmacist will know how to enter the codes on the coupon to pull up the lowest discount available.

## 2023-01-31 ENCOUNTER — APPOINTMENT (OUTPATIENT)
Dept: GENERAL RADIOLOGY | Age: 73
End: 2023-01-31
Attending: EMERGENCY MEDICINE

## 2023-01-31 ENCOUNTER — HOSPITAL ENCOUNTER (EMERGENCY)
Age: 73
Discharge: HOME | End: 2023-01-31
Attending: EMERGENCY MEDICINE

## 2023-01-31 VITALS
HEIGHT: 71 IN | SYSTOLIC BLOOD PRESSURE: 179 MMHG | OXYGEN SATURATION: 100 % | RESPIRATION RATE: 16 BRPM | DIASTOLIC BLOOD PRESSURE: 71 MMHG | WEIGHT: 207 LBS | BODY MASS INDEX: 28.98 KG/M2 | HEART RATE: 84 BPM | TEMPERATURE: 97.9 F

## 2023-01-31 DIAGNOSIS — M10.031 ACUTE IDIOPATHIC GOUT OF RIGHT WRIST: Primary | ICD-10-CM

## 2023-01-31 PROCEDURE — 99283 EMERGENCY DEPT VISIT LOW MDM: CPT

## 2023-01-31 PROCEDURE — 74011250637 HC RX REV CODE- 250/637: Performed by: EMERGENCY MEDICINE

## 2023-01-31 PROCEDURE — 74011636637 HC RX REV CODE- 636/637: Performed by: EMERGENCY MEDICINE

## 2023-01-31 PROCEDURE — 73130 X-RAY EXAM OF HAND: CPT

## 2023-01-31 RX ORDER — HYDROCODONE BITARTRATE AND ACETAMINOPHEN 5; 325 MG/1; MG/1
1 TABLET ORAL
Qty: 12 TABLET | Refills: 0 | Status: SHIPPED | OUTPATIENT
Start: 2023-01-31 | End: 2023-02-03

## 2023-01-31 RX ORDER — PREDNISONE 20 MG/1
60 TABLET ORAL DAILY
Qty: 15 TABLET | Refills: 0 | Status: SHIPPED | OUTPATIENT
Start: 2023-01-31 | End: 2023-02-05

## 2023-01-31 RX ORDER — PREDNISONE 20 MG/1
60 TABLET ORAL
Status: COMPLETED | OUTPATIENT
Start: 2023-01-31 | End: 2023-01-31

## 2023-01-31 RX ORDER — COLCHICINE 0.6 MG/1
CAPSULE ORAL
Qty: 3 CAPSULE | Refills: 0 | Status: SHIPPED | OUTPATIENT
Start: 2023-01-31

## 2023-01-31 RX ORDER — HYDROCODONE BITARTRATE AND ACETAMINOPHEN 5; 325 MG/1; MG/1
1 TABLET ORAL
Status: COMPLETED | OUTPATIENT
Start: 2023-01-31 | End: 2023-01-31

## 2023-01-31 RX ADMIN — PREDNISONE 60 MG: 20 TABLET ORAL at 11:07

## 2023-01-31 RX ADMIN — HYDROCODONE BITARTRATE AND ACETAMINOPHEN 1 TABLET: 5; 325 TABLET ORAL at 11:07

## 2023-01-31 NOTE — ED NOTES
Patient has a ride home. Given discharge instructions and verbalized understanding. Went over prescriptions. Wrist Immobilizer in place. NAD noted.

## 2023-01-31 NOTE — ED TRIAGE NOTES
Patient is ambulatory into room for c/o right hand and knuckle swelling for a couple of days. Area hot to touch and red. Hx of gout. Patient says this feels like gout. NAD noted.

## 2023-01-31 NOTE — ED PROVIDER NOTES
The patient is a 81-year-old male with past medical history significant for gout, who presents to the ED today with right hand swelling. He states that he has had pain in his right hand and his right wrist for a week and a half. He began having swelling 3 days ago ago. He denies any injuries, trauma or bites. He states that he has had gout previously in his right wrist.  He also gets it in his feet, knees and hands. He believes that this is another gout flare. He also denies any fevers, chills, nausea or vomiting. No past medical history on file. No past surgical history on file. No family history on file. Social History     Socioeconomic History    Marital status: SINGLE     Spouse name: Not on file    Number of children: Not on file    Years of education: Not on file    Highest education level: Not on file   Occupational History    Not on file   Tobacco Use    Smoking status: Every Day     Packs/day: 0.25     Types: Cigarettes    Smokeless tobacco: Never    Tobacco comments:     3 cig a day   Substance and Sexual Activity    Alcohol use: Yes     Comment: 1/2 pint every 2-3 days    Drug use: Yes     Types: Marijuana    Sexual activity: Not on file   Other Topics Concern    Not on file   Social History Narrative    Not on file     Social Determinants of Health     Financial Resource Strain: Not on file   Food Insecurity: Not on file   Transportation Needs: Not on file   Physical Activity: Not on file   Stress: Not on file   Social Connections: Not on file   Intimate Partner Violence: Not on file   Housing Stability: Not on file         ALLERGIES: Patient has no known allergies. Review of Systems   All other systems reviewed and are negative. Vitals:    01/31/23 0834   BP: (!) 179/71   Pulse: 84   Resp: 16   Temp: 97.9 °F (36.6 °C)   SpO2: 100%   Weight: 93.9 kg (207 lb)   Height: 5' 11\" (1.803 m)            Physical Exam  Vitals and nursing note reviewed.    Constitutional: Appearance: Normal appearance. HENT:      Head: Normocephalic and atraumatic. Right Ear: External ear normal.      Left Ear: External ear normal.      Nose: Nose normal.      Mouth/Throat:      Mouth: Mucous membranes are moist.      Pharynx: Oropharynx is clear. Eyes:      Extraocular Movements: Extraocular movements intact. Conjunctiva/sclera: Conjunctivae normal.      Pupils: Pupils are equal, round, and reactive to light. Cardiovascular:      Rate and Rhythm: Normal rate and regular rhythm. Pulses: Normal pulses. Heart sounds: Normal heart sounds. Pulmonary:      Effort: Pulmonary effort is normal.      Breath sounds: Normal breath sounds. Musculoskeletal:      Cervical back: Normal range of motion and neck supple. Comments: The radial aspect of the right wrist is erythematous, tender to palpation, and warm to the touch. He has erythema that tracks forward on the posterior surface of his right hand in the extensor tendons. He also has some soft tissue swelling. Distally neurovascularly intact. Skin:     General: Skin is warm and dry. Capillary Refill: Capillary refill takes less than 2 seconds. Findings: Erythema present. Neurological:      General: No focal deficit present. Mental Status: He is alert and oriented to person, place, and time. Psychiatric:         Mood and Affect: Mood normal.         Behavior: Behavior normal.         Thought Content: Thought content normal.         Judgment: Judgment normal.      XR HAND RT MIN 3 V   Final Result   Metallic foreign body in soft tissues of dorsal fourth digit. No erosions or focal soft tissue swelling. Medical Decision Making  The patient is a 67-year-old male with past medical history significant for gout, who presents the ED today with right wrist pain, swelling and right hand pain. His symptoms are consistent with his prior episodes of gout.   His x-ray shows a metallic foreign body in the soft tissues of the dorsal fourth digit. I went and asked the patient about that, and the area where the metal is is well-healed without any overlying erythema or skin breakdown. He states that he was trying to shoot a basket and ended up with a piece of metal in his hand when he was approximately 7years old and it has been there ever since. I do not believe that he has an infected foreign body in his skin. The patient will be discharged home with Norco, prednisone and 2 doses of colchicine. He will be advised to follow-up with his primary care physician in 2 to 3 days. Return precautions have been given. Amount and/or Complexity of Data Reviewed  Radiology: ordered. Risk  Prescription drug management.            Procedures

## 2023-11-16 ENCOUNTER — HOSPITAL ENCOUNTER (EMERGENCY)
Facility: HOSPITAL | Age: 73
Discharge: HOME | End: 2023-11-16
Attending: STUDENT IN AN ORGANIZED HEALTH CARE EDUCATION/TRAINING PROGRAM
Payer: MEDICARE

## 2023-11-16 VITALS
BODY MASS INDEX: 23.8 KG/M2 | TEMPERATURE: 98 F | SYSTOLIC BLOOD PRESSURE: 157 MMHG | RESPIRATION RATE: 16 BRPM | OXYGEN SATURATION: 100 % | HEIGHT: 71 IN | DIASTOLIC BLOOD PRESSURE: 78 MMHG | HEART RATE: 82 BPM | WEIGHT: 170 LBS

## 2023-11-16 DIAGNOSIS — H61.23 BILATERAL IMPACTED CERUMEN: Primary | ICD-10-CM

## 2023-11-16 PROCEDURE — 99283 EMERGENCY DEPT VISIT LOW MDM: CPT

## 2023-11-16 PROCEDURE — 69209 REMOVE IMPACTED EAR WAX UNI: CPT

## 2023-11-16 ASSESSMENT — ENCOUNTER SYMPTOMS
SORE THROAT: 0
NAUSEA: 0
SHORTNESS OF BREATH: 0
VOMITING: 0
ABDOMINAL PAIN: 0
CHEST TIGHTNESS: 0
DIARRHEA: 0

## 2023-11-16 ASSESSMENT — LIFESTYLE VARIABLES
HOW OFTEN DO YOU HAVE A DRINK CONTAINING ALCOHOL: 2-3 TIMES A WEEK
HOW MANY STANDARD DRINKS CONTAINING ALCOHOL DO YOU HAVE ON A TYPICAL DAY: 3 OR 4

## 2023-11-16 ASSESSMENT — PAIN - FUNCTIONAL ASSESSMENT: PAIN_FUNCTIONAL_ASSESSMENT: NONE - DENIES PAIN

## 2023-11-16 NOTE — ED PROVIDER NOTES
EMERGENCY DEPARTMENT HISTORY AND PHYSICAL EXAM      Date: 11/16/2023  Patient Name: Satinder Skinner    History of Presenting Illness     Chief Complaint   Patient presents with    Ear Fullness       76-year-old male presenting to the emergency department with complaints of bilateral ear fullness and decreasing hearing for the past couple of months. Denies any headaches, vision changes, cough, shortness of breath, numbness or tingling or weakness          PCP: No primary care provider on file. No current facility-administered medications for this encounter. No current outpatient medications on file. Past History     Past Medical History:  History reviewed. No pertinent past medical history. Past Surgical History:  History reviewed. No pertinent surgical history. Family History:  History reviewed. No pertinent family history. Social History:  Social History     Tobacco Use    Smoking status: Every Day     Packs/day: .25     Types: Cigarettes    Smokeless tobacco: Never    Tobacco comments:     Quit smoking: 3 cig a day   Substance Use Topics    Alcohol use: Yes    Drug use: Yes     Types: Marijuana Salli Endo)       Allergies:  No Known Allergies      Review of Systems       Review of Systems   Constitutional:  Negative for activity change, appetite change, fatigue and fever. HENT:  Positive for ear pain and hearing loss. Negative for congestion, ear discharge and sore throat. Respiratory:  Negative for chest tightness and shortness of breath. Cardiovascular:  Negative for chest pain. Gastrointestinal:  Negative for abdominal pain, diarrhea, nausea and vomiting. Genitourinary:  Negative for difficulty urinating, dysuria, flank pain, hematuria and urgency. Musculoskeletal:  Negative for arthralgias. Skin:  Negative for rash. Neurological:  Negative for dizziness, weakness, light-headedness, numbness and headaches. Psychiatric/Behavioral:  Negative for agitation.           Physical Exam

## 2023-11-16 NOTE — ED TRIAGE NOTES
POC BS: 46 at this time. Will obtain [facility] x2 with next feeds. Mother aware and agreeable, will call before next feeding. Pt to ED for eval of ear fullness and difficulty hearing x months. Pt unable to say of it is one ear or both.

## 2023-11-16 NOTE — ED NOTES
Right ear irrigated. Patient tolerated well and reports relief. Informed provider.       Wilbert Raines RN  11/16/23 9633

## 2024-01-04 ENCOUNTER — HOSPITAL ENCOUNTER (EMERGENCY)
Facility: HOSPITAL | Age: 74
Discharge: HOME | End: 2024-01-04
Attending: STUDENT IN AN ORGANIZED HEALTH CARE EDUCATION/TRAINING PROGRAM

## 2024-01-04 VITALS
BODY MASS INDEX: 25.18 KG/M2 | RESPIRATION RATE: 14 BRPM | SYSTOLIC BLOOD PRESSURE: 153 MMHG | OXYGEN SATURATION: 97 % | TEMPERATURE: 97.8 F | DIASTOLIC BLOOD PRESSURE: 73 MMHG | HEIGHT: 69 IN | WEIGHT: 170 LBS | HEART RATE: 73 BPM

## 2024-01-04 DIAGNOSIS — S93.402A SPRAIN OF LEFT ANKLE, UNSPECIFIED LIGAMENT, INITIAL ENCOUNTER: Primary | ICD-10-CM

## 2024-01-04 DIAGNOSIS — K40.90 UNILATERAL INGUINAL HERNIA WITHOUT OBSTRUCTION OR GANGRENE, RECURRENCE NOT SPECIFIED: ICD-10-CM

## 2024-01-04 PROCEDURE — 6370000000 HC RX 637 (ALT 250 FOR IP): Performed by: STUDENT IN AN ORGANIZED HEALTH CARE EDUCATION/TRAINING PROGRAM

## 2024-01-04 PROCEDURE — 99283 EMERGENCY DEPT VISIT LOW MDM: CPT

## 2024-01-04 RX ORDER — IBUPROFEN 600 MG/1
600 TABLET ORAL
Status: COMPLETED | OUTPATIENT
Start: 2024-01-04 | End: 2024-01-04

## 2024-01-04 RX ORDER — LIDOCAINE 4 G/G
1 PATCH TOPICAL
Status: DISCONTINUED | OUTPATIENT
Start: 2024-01-04 | End: 2024-01-04 | Stop reason: HOSPADM

## 2024-01-04 RX ORDER — ACETAMINOPHEN 500 MG
1000 TABLET ORAL
Status: COMPLETED | OUTPATIENT
Start: 2024-01-04 | End: 2024-01-04

## 2024-01-04 RX ADMIN — ACETAMINOPHEN 1000 MG: 500 TABLET ORAL at 06:10

## 2024-01-04 RX ADMIN — IBUPROFEN 600 MG: 600 TABLET, FILM COATED ORAL at 06:10

## 2024-01-04 NOTE — ED TRIAGE NOTES
AOX4 with a cc of l ankle pain for about 2 days. Non traumatic. Has taken tylenol, without alleviation. Has occasional numbness in toes of affected foot. Pt is also complaining of a knot in his abdomen just below navel. Non tender to palp. Noticed for a month, fluctuates in size.

## 2024-01-16 ENCOUNTER — TELEPHONE (OUTPATIENT)
Age: 74
End: 2024-01-16

## 2024-01-16 NOTE — TELEPHONE ENCOUNTER
Left message with family member to contact our office to schedule an appointment for a post ER follow up evaluation of inguinal hernia.

## 2024-01-18 ENCOUNTER — APPOINTMENT (OUTPATIENT)
Facility: HOSPITAL | Age: 74
End: 2024-01-18

## 2024-01-18 ENCOUNTER — HOSPITAL ENCOUNTER (EMERGENCY)
Facility: HOSPITAL | Age: 74
Discharge: HOME | End: 2024-01-18
Attending: STUDENT IN AN ORGANIZED HEALTH CARE EDUCATION/TRAINING PROGRAM

## 2024-01-18 VITALS
TEMPERATURE: 99 F | HEIGHT: 69 IN | BODY MASS INDEX: 25.18 KG/M2 | DIASTOLIC BLOOD PRESSURE: 71 MMHG | OXYGEN SATURATION: 96 % | HEART RATE: 90 BPM | RESPIRATION RATE: 20 BRPM | SYSTOLIC BLOOD PRESSURE: 137 MMHG | WEIGHT: 170 LBS

## 2024-01-18 DIAGNOSIS — J18.9 PNEUMONIA OF RIGHT LOWER LOBE DUE TO INFECTIOUS ORGANISM: ICD-10-CM

## 2024-01-18 DIAGNOSIS — U07.1 COVID: Primary | ICD-10-CM

## 2024-01-18 LAB
ALBUMIN SERPL-MCNC: 3.1 G/DL (ref 3.4–5)
ALBUMIN/GLOB SERPL: 0.8 (ref 0.8–1.7)
ALP SERPL-CCNC: 46 U/L (ref 45–117)
ALT SERPL-CCNC: 32 U/L (ref 16–61)
ANION GAP SERPL CALC-SCNC: 6 MMOL/L (ref 3–18)
AST SERPL-CCNC: 20 U/L (ref 10–38)
BASOPHILS # BLD: 0 K/UL (ref 0–0.1)
BASOPHILS NFR BLD: 1 % (ref 0–2)
BILIRUB SERPL-MCNC: 0.3 MG/DL (ref 0.2–1)
BUN SERPL-MCNC: 18 MG/DL (ref 7–18)
BUN/CREAT SERPL: 12 (ref 12–20)
CALCIUM SERPL-MCNC: 8.7 MG/DL (ref 8.5–10.1)
CHLORIDE SERPL-SCNC: 102 MMOL/L (ref 100–111)
CO2 SERPL-SCNC: 28 MMOL/L (ref 21–32)
CREAT SERPL-MCNC: 1.55 MG/DL (ref 0.6–1.3)
DIFFERENTIAL METHOD BLD: ABNORMAL
EKG ATRIAL RATE: 88 BPM
EKG DIAGNOSIS: NORMAL
EKG P AXIS: 65 DEGREES
EKG P-R INTERVAL: 174 MS
EKG Q-T INTERVAL: 332 MS
EKG QRS DURATION: 76 MS
EKG QTC CALCULATION (BAZETT): 401 MS
EKG R AXIS: 24 DEGREES
EKG T AXIS: 30 DEGREES
EKG VENTRICULAR RATE: 88 BPM
EOSINOPHIL # BLD: 0.1 K/UL (ref 0–0.4)
EOSINOPHIL NFR BLD: 1 % (ref 0–5)
ERYTHROCYTE [DISTWIDTH] IN BLOOD BY AUTOMATED COUNT: 14.8 % (ref 11.6–14.5)
FLUAV AG NPH QL IA: NEGATIVE
FLUBV AG NOSE QL IA: NEGATIVE
GLOBULIN SER CALC-MCNC: 4.1 G/DL (ref 2–4)
GLUCOSE SERPL-MCNC: 117 MG/DL (ref 74–99)
HCT VFR BLD AUTO: 40.4 % (ref 36–48)
HGB BLD-MCNC: 13.6 G/DL (ref 13–16)
IMM GRANULOCYTES # BLD AUTO: 0 K/UL (ref 0–0.04)
IMM GRANULOCYTES NFR BLD AUTO: 1 % (ref 0–0.5)
LYMPHOCYTES # BLD: 2 K/UL (ref 0.9–3.6)
LYMPHOCYTES NFR BLD: 24 % (ref 21–52)
MCH RBC QN AUTO: 27.8 PG (ref 24–34)
MCHC RBC AUTO-ENTMCNC: 33.7 G/DL (ref 31–37)
MCV RBC AUTO: 82.6 FL (ref 78–100)
MONOCYTES # BLD: 1.4 K/UL (ref 0.05–1.2)
MONOCYTES NFR BLD: 17 % (ref 3–10)
NEUTS SEG # BLD: 4.9 K/UL (ref 1.8–8)
NEUTS SEG NFR BLD: 57 % (ref 40–73)
NRBC # BLD: 0 K/UL (ref 0–0.01)
NRBC BLD-RTO: 0 PER 100 WBC
PLATELET # BLD AUTO: 355 K/UL (ref 135–420)
PMV BLD AUTO: 8.9 FL (ref 9.2–11.8)
POTASSIUM SERPL-SCNC: 4 MMOL/L (ref 3.5–5.5)
PROT SERPL-MCNC: 7.2 G/DL (ref 6.4–8.2)
RBC # BLD AUTO: 4.89 M/UL (ref 4.35–5.65)
SARS-COV-2 RDRP RESP QL NAA+PROBE: DETECTED
SODIUM SERPL-SCNC: 136 MMOL/L (ref 136–145)
SOURCE: ABNORMAL
WBC # BLD AUTO: 8.6 K/UL (ref 4.6–13.2)

## 2024-01-18 PROCEDURE — 87635 SARS-COV-2 COVID-19 AMP PRB: CPT

## 2024-01-18 PROCEDURE — 71045 X-RAY EXAM CHEST 1 VIEW: CPT

## 2024-01-18 PROCEDURE — 99285 EMERGENCY DEPT VISIT HI MDM: CPT

## 2024-01-18 PROCEDURE — 80053 COMPREHEN METABOLIC PANEL: CPT

## 2024-01-18 PROCEDURE — 85025 COMPLETE CBC W/AUTO DIFF WBC: CPT

## 2024-01-18 PROCEDURE — 6370000000 HC RX 637 (ALT 250 FOR IP): Performed by: STUDENT IN AN ORGANIZED HEALTH CARE EDUCATION/TRAINING PROGRAM

## 2024-01-18 PROCEDURE — 6360000002 HC RX W HCPCS: Performed by: STUDENT IN AN ORGANIZED HEALTH CARE EDUCATION/TRAINING PROGRAM

## 2024-01-18 PROCEDURE — 93005 ELECTROCARDIOGRAM TRACING: CPT | Performed by: STUDENT IN AN ORGANIZED HEALTH CARE EDUCATION/TRAINING PROGRAM

## 2024-01-18 PROCEDURE — 93010 ELECTROCARDIOGRAM REPORT: CPT | Performed by: INTERNAL MEDICINE

## 2024-01-18 PROCEDURE — 87804 INFLUENZA ASSAY W/OPTIC: CPT

## 2024-01-18 RX ORDER — IBUPROFEN 600 MG/1
600 TABLET ORAL
Status: COMPLETED | OUTPATIENT
Start: 2024-01-18 | End: 2024-01-18

## 2024-01-18 RX ORDER — AZITHROMYCIN 250 MG/1
250 TABLET, FILM COATED ORAL SEE ADMIN INSTRUCTIONS
Qty: 6 TABLET | Refills: 0 | Status: SHIPPED | OUTPATIENT
Start: 2024-01-18 | End: 2024-01-23

## 2024-01-18 RX ORDER — DEXAMETHASONE 4 MG/1
10 TABLET ORAL ONCE
Status: COMPLETED | OUTPATIENT
Start: 2024-01-18 | End: 2024-01-18

## 2024-01-18 RX ADMIN — DEXAMETHASONE 10 MG: 4 TABLET ORAL at 08:46

## 2024-01-18 RX ADMIN — IBUPROFEN 600 MG: 600 TABLET, FILM COATED ORAL at 08:46

## 2024-01-18 ASSESSMENT — LIFESTYLE VARIABLES
HOW OFTEN DO YOU HAVE A DRINK CONTAINING ALCOHOL: 2-3 TIMES A WEEK
HOW MANY STANDARD DRINKS CONTAINING ALCOHOL DO YOU HAVE ON A TYPICAL DAY: 5 OR 6

## 2024-01-18 ASSESSMENT — PAIN DESCRIPTION - PAIN TYPE: TYPE: ACUTE PAIN

## 2024-01-18 ASSESSMENT — PAIN SCALES - GENERAL: PAINLEVEL_OUTOF10: 9

## 2024-01-18 ASSESSMENT — PAIN DESCRIPTION - LOCATION: LOCATION: NOSE

## 2024-01-18 NOTE — ED PROVIDER NOTES
[facility] EMERGENCY DEPT  EMERGENCY DEPARTMENT ENCOUNTER      Pt Name: Noman Verma  MRN: [id number]  Birthdate 1950  Date of evaluation: 1/18/2024  Provider: Lu Rojas MD    CHIEF COMPLAINT       Chief Complaint   Patient presents with    Pleurisy    Pharyngitis    Cough         HISTORY OF PRESENT ILLNESS   (Location/Symptom, Timing/Onset, Context/Setting, Quality, Duration, Modifying Factors, Severity)  Note limiting factors.   Noman Verma is a 73 y.o. male who presents to the emergency department with a 3-day history of sore throat, runny nose and cough.  Has had recent sick contacts.  He is vaccinated against COVID.  Has slight chest pain and difficulty breathing.  Has not had any fevers.  Denies any nausea vomiting or diarrhea.     Nursing Notes were reviewed.    REVIEW OF SYSTEMS    (2-9 systems for level 4, 10 or more for level 5)     Constitutional: [no fever]  HENT: [no ear pain]  Eyes: [no change in vision]  Respiratory: Positive shortness of breath  Cardio: Positive chest pain  GI: [no blood in stool]  : [no hematuria]  MSK: [no back pain]  Skin: [no rashes]  Neuro: Positive headache    Except as noted above the remainder of the review of systems was reviewed and negative.       PAST MEDICAL HISTORY     Past Medical History:   Diagnosis Date    Hernia of abdominal cavity          SURGICAL HISTORY     No past surgical history on file.      CURRENT MEDICATIONS       Previous Medications    No medications on file       ALLERGIES     Patient has no known allergies.    FAMILY HISTORY     No family history on file.       SOCIAL HISTORY       Social History     Socioeconomic History    Marital status: Single   Tobacco Use    Smoking status: Every Day     Current packs/day: 0.25     Types: Cigarettes    Smokeless tobacco: Never    Tobacco comments:     Quit smoking: 3 cig a day   Substance and Sexual Activity    Alcohol use: Yes     Alcohol/week: 3.0 standard drinks of alcohol     Types: 3 Shots of liquor

## 2024-01-18 NOTE — ED NOTES
RN assumed care of patient at this time. Patient reports cough and pleurisy for three days. Patient is stable on monitor.

## 2024-02-05 ENCOUNTER — TELEPHONE (OUTPATIENT)
Age: 74
End: 2024-02-05

## 2024-02-05 ENCOUNTER — OFFICE VISIT (OUTPATIENT)
Age: 74
End: 2024-02-05
Payer: MEDICARE

## 2024-02-05 VITALS
SYSTOLIC BLOOD PRESSURE: 144 MMHG | OXYGEN SATURATION: 97 % | TEMPERATURE: 98 F | DIASTOLIC BLOOD PRESSURE: 88 MMHG | BODY MASS INDEX: 32.08 KG/M2 | WEIGHT: 216.6 LBS | HEIGHT: 69 IN | HEART RATE: 80 BPM

## 2024-02-05 DIAGNOSIS — K40.90 LEFT INGUINAL HERNIA: Primary | ICD-10-CM

## 2024-02-05 PROCEDURE — 99204 OFFICE O/P NEW MOD 45 MIN: CPT | Performed by: SURGERY

## 2024-02-05 PROCEDURE — 3017F COLORECTAL CA SCREEN DOC REV: CPT | Performed by: SURGERY

## 2024-02-05 PROCEDURE — G8417 CALC BMI ABV UP PARAM F/U: HCPCS | Performed by: SURGERY

## 2024-02-05 PROCEDURE — 4004F PT TOBACCO SCREEN RCVD TLK: CPT | Performed by: SURGERY

## 2024-02-05 PROCEDURE — 1123F ACP DISCUSS/DSCN MKR DOCD: CPT | Performed by: SURGERY

## 2024-02-05 PROCEDURE — G8484 FLU IMMUNIZE NO ADMIN: HCPCS | Performed by: SURGERY

## 2024-02-05 PROCEDURE — G8427 DOCREV CUR MEDS BY ELIG CLIN: HCPCS | Performed by: SURGERY

## 2024-02-05 NOTE — PROGRESS NOTES
Noman Verma is a 73 y.o. male (: 1950) presenting to address:    Chief Complaint   Patient presents with    New Patient     Inguinal hernia/referred by [facility]       Medication list and allergies have been reviewed with Noman Bayron and updated as of today's date.     I have gone over all Medical, Surgical and Social History with Noman Verma and updated/added the information accordingly.     
Adult)   Pulse 80   Temp 98 °F (36.7 °C) (Temporal)   Ht 1.753 m (5' 9\")   Wt 98.2 kg (216 lb 9.6 oz)   SpO2 97%   BMI 31.99 kg/m²     Physical Exam:      General:  in no apparent distress, alert, oriented times 3, afebrile, and normal vitals   Eyes:  conjunctivae and sclerae normal, pupils equal, round, reactive to light   Throat & Neck: normal, no erythema or exudates noted. , and no palpable masses   Lungs:   clear to auscultation bilaterally   Heart:  Regular rate and rhythm   Abdomen:   rounded, soft, nontender, nondistended, no masses or organomegaly.  There is a relatively large left inguinal hernia that is reducible with gentle continuous pressure but bulges back out as soon as the pressure is taken of.  There is also what seems to be a small left hydrocele as well.   Extremities: extremities normal, atraumatic, no cyanosis or edema   Skin: Normal.       Imaging and Lab Review:     CBC:   Lab Results   Component Value Date/Time    WBC 8.6 01/18/2024 07:54 AM    RBC 4.89 01/18/2024 07:54 AM    HGB 13.6 01/18/2024 07:54 AM    HCT 40.4 01/18/2024 07:54 AM     01/18/2024 07:54 AM     BMP:   Lab Results   Component Value Date/Time     01/18/2024 07:54 AM    K 4.0 01/18/2024 07:54 AM     01/18/2024 07:54 AM    CO2 28 01/18/2024 07:54 AM    BUN 18 01/18/2024 07:54 AM     CMP:  Lab Results   Component Value Date/Time     01/18/2024 07:54 AM    K 4.0 01/18/2024 07:54 AM     01/18/2024 07:54 AM    CO2 28 01/18/2024 07:54 AM    BUN 18 01/18/2024 07:54 AM    GLOB 4.1 01/18/2024 07:54 AM       No results found for this or any previous visit (from the past 24 hour(s)).    images and reports reviewed    Assessment:   Noman Verma is a 73 y.o. male who is presenting with a picture of symptomatic left inguinal hernia. I Discussed the possibility of incarceration, strangulation, enlargement in size over time, and the risk of emergency surgery in the face of strangulation. I also discussed

## 2024-02-12 ENCOUNTER — TELEPHONE (OUTPATIENT)
Age: 74
End: 2024-02-12

## 2024-02-12 NOTE — TELEPHONE ENCOUNTER
Left message with family member for Mr. Verma to contact our office re: schedule surgery with Dr. Sterling.

## 2024-02-14 ENCOUNTER — TELEPHONE (OUTPATIENT)
Age: 74
End: 2024-02-14

## 2024-02-14 NOTE — TELEPHONE ENCOUNTER
Unsuccessful attempt to contact Mr. Verma - home number [phone] voicemail not set up. Left voicemail message with relative at [phone]

## 2024-02-20 ENCOUNTER — ANESTHESIA EVENT (OUTPATIENT)
Facility: HOSPITAL | Age: 74
End: 2024-02-20
Payer: MEDICARE

## 2024-02-21 ENCOUNTER — ANESTHESIA (OUTPATIENT)
Facility: HOSPITAL | Age: 74
End: 2024-02-21
Payer: MEDICARE

## 2024-02-21 ENCOUNTER — TELEPHONE (OUTPATIENT)
Age: 74
End: 2024-02-21

## 2024-02-21 ENCOUNTER — HOSPITAL ENCOUNTER (OUTPATIENT)
Facility: HOSPITAL | Age: 74
Setting detail: OUTPATIENT SURGERY
Discharge: HOME | End: 2024-02-21
Attending: SURGERY | Admitting: SURGERY
Payer: MEDICARE

## 2024-02-21 VITALS
SYSTOLIC BLOOD PRESSURE: 111 MMHG | HEART RATE: 71 BPM | OXYGEN SATURATION: 97 % | TEMPERATURE: 97.6 F | HEIGHT: 69 IN | DIASTOLIC BLOOD PRESSURE: 65 MMHG | BODY MASS INDEX: 32.02 KG/M2 | WEIGHT: 216.2 LBS | RESPIRATION RATE: 18 BRPM

## 2024-02-21 DIAGNOSIS — Z87.19 S/P HERNIA REPAIR: Primary | ICD-10-CM

## 2024-02-21 DIAGNOSIS — Z98.890 S/P HERNIA REPAIR: Primary | ICD-10-CM

## 2024-02-21 LAB
AMPHET UR QL SCN: NEGATIVE
BARBITURATES UR QL SCN: NEGATIVE
BENZODIAZ UR QL: NEGATIVE
CANNABINOIDS UR QL SCN: NEGATIVE
COCAINE UR QL SCN: NEGATIVE
Lab: NORMAL
METHADONE UR QL: NEGATIVE
OPIATES UR QL: NEGATIVE
PCP UR QL: NEGATIVE

## 2024-02-21 PROCEDURE — 7100000000 HC PACU RECOVERY - FIRST 15 MIN: Performed by: SURGERY

## 2024-02-21 PROCEDURE — 7100000010 HC PHASE II RECOVERY - FIRST 15 MIN: Performed by: SURGERY

## 2024-02-21 PROCEDURE — 2709999900 HC NON-CHARGEABLE SUPPLY: Performed by: SURGERY

## 2024-02-21 PROCEDURE — C1781 MESH (IMPLANTABLE): HCPCS | Performed by: SURGERY

## 2024-02-21 PROCEDURE — 2580000003 HC RX 258: Performed by: SURGERY

## 2024-02-21 PROCEDURE — 6360000002 HC RX W HCPCS: Performed by: NURSE ANESTHETIST, CERTIFIED REGISTERED

## 2024-02-21 PROCEDURE — 2580000003 HC RX 258: Performed by: NURSE ANESTHETIST, CERTIFIED REGISTERED

## 2024-02-21 PROCEDURE — 49505 PRP I/HERN INIT REDUC >5 YR: CPT | Performed by: SURGERY

## 2024-02-21 PROCEDURE — 7100000001 HC PACU RECOVERY - ADDTL 15 MIN: Performed by: SURGERY

## 2024-02-21 PROCEDURE — 3600000019 HC SURGERY ROBOT ADDTL 15MIN: Performed by: SURGERY

## 2024-02-21 PROCEDURE — 3700000000 HC ANESTHESIA ATTENDED CARE: Performed by: SURGERY

## 2024-02-21 PROCEDURE — 7100000011 HC PHASE II RECOVERY - ADDTL 15 MIN: Performed by: SURGERY

## 2024-02-21 PROCEDURE — 2500000003 HC RX 250 WO HCPCS: Performed by: NURSE ANESTHETIST, CERTIFIED REGISTERED

## 2024-02-21 PROCEDURE — S2900 ROBOTIC SURGICAL SYSTEM: HCPCS | Performed by: SURGERY

## 2024-02-21 PROCEDURE — 2500000003 HC RX 250 WO HCPCS: Performed by: SURGERY

## 2024-02-21 PROCEDURE — 80307 DRUG TEST PRSMV CHEM ANLYZR: CPT

## 2024-02-21 PROCEDURE — A4216 STERILE WATER/SALINE, 10 ML: HCPCS | Performed by: NURSE ANESTHETIST, CERTIFIED REGISTERED

## 2024-02-21 PROCEDURE — A4217 STERILE WATER/SALINE, 500 ML: HCPCS | Performed by: SURGERY

## 2024-02-21 PROCEDURE — 3700000001 HC ADD 15 MINUTES (ANESTHESIA): Performed by: SURGERY

## 2024-02-21 PROCEDURE — 6360000002 HC RX W HCPCS: Performed by: SURGERY

## 2024-02-21 PROCEDURE — 3600000009 HC SURGERY ROBOT BASE: Performed by: SURGERY

## 2024-02-21 DEVICE — MESH CS LEFT LARGE 10CM X 16CM: Type: IMPLANTABLE DEVICE | Site: ABDOMEN | Status: FUNCTIONAL

## 2024-02-21 RX ORDER — ONDANSETRON 2 MG/ML
4 INJECTION INTRAMUSCULAR; INTRAVENOUS
Status: DISCONTINUED | OUTPATIENT
Start: 2024-02-21 | End: 2024-02-21 | Stop reason: HOSPADM

## 2024-02-21 RX ORDER — ROCURONIUM BROMIDE 10 MG/ML
INJECTION, SOLUTION INTRAVENOUS PRN
Status: DISCONTINUED | OUTPATIENT
Start: 2024-02-21 | End: 2024-02-21 | Stop reason: SDUPTHER

## 2024-02-21 RX ORDER — SODIUM CHLORIDE 0.9 % (FLUSH) 0.9 %
5-40 SYRINGE (ML) INJECTION EVERY 12 HOURS SCHEDULED
Status: DISCONTINUED | OUTPATIENT
Start: 2024-02-21 | End: 2024-02-21 | Stop reason: HOSPADM

## 2024-02-21 RX ORDER — OXYCODONE HYDROCHLORIDE AND ACETAMINOPHEN 5; 325 MG/1; MG/1
1 TABLET ORAL EVERY 6 HOURS PRN
Qty: 12 TABLET | Refills: 0 | Status: SHIPPED | OUTPATIENT
Start: 2024-02-21 | End: 2024-02-24

## 2024-02-21 RX ORDER — PROPOFOL 10 MG/ML
INJECTION, EMULSION INTRAVENOUS PRN
Status: DISCONTINUED | OUTPATIENT
Start: 2024-02-21 | End: 2024-02-21 | Stop reason: SDUPTHER

## 2024-02-21 RX ORDER — FENTANYL CITRATE 50 UG/ML
25 INJECTION, SOLUTION INTRAMUSCULAR; INTRAVENOUS EVERY 5 MIN PRN
Status: DISCONTINUED | OUTPATIENT
Start: 2024-02-21 | End: 2024-02-21 | Stop reason: HOSPADM

## 2024-02-21 RX ORDER — SUCCINYLCHOLINE/SOD CL,ISO/PF 100 MG/5ML
SYRINGE (ML) INTRAVENOUS PRN
Status: DISCONTINUED | OUTPATIENT
Start: 2024-02-21 | End: 2024-02-21 | Stop reason: SDUPTHER

## 2024-02-21 RX ORDER — GLYCOPYRROLATE 0.2 MG/ML
INJECTION INTRAMUSCULAR; INTRAVENOUS PRN
Status: DISCONTINUED | OUTPATIENT
Start: 2024-02-21 | End: 2024-02-21 | Stop reason: SDUPTHER

## 2024-02-21 RX ORDER — SODIUM CHLORIDE 0.9 % (FLUSH) 0.9 %
5-40 SYRINGE (ML) INJECTION PRN
Status: DISCONTINUED | OUTPATIENT
Start: 2024-02-21 | End: 2024-02-21 | Stop reason: HOSPADM

## 2024-02-21 RX ORDER — LIDOCAINE HYDROCHLORIDE 10 MG/ML
1 INJECTION, SOLUTION EPIDURAL; INFILTRATION; INTRACAUDAL; PERINEURAL
Status: DISCONTINUED | OUTPATIENT
Start: 2024-02-21 | End: 2024-02-21 | Stop reason: HOSPADM

## 2024-02-21 RX ORDER — MIDAZOLAM HYDROCHLORIDE 1 MG/ML
INJECTION INTRAMUSCULAR; INTRAVENOUS PRN
Status: DISCONTINUED | OUTPATIENT
Start: 2024-02-21 | End: 2024-02-21 | Stop reason: SDUPTHER

## 2024-02-21 RX ORDER — ONDANSETRON 2 MG/ML
INJECTION INTRAMUSCULAR; INTRAVENOUS PRN
Status: DISCONTINUED | OUTPATIENT
Start: 2024-02-21 | End: 2024-02-21 | Stop reason: SDUPTHER

## 2024-02-21 RX ORDER — NEOSTIGMINE METHYLSULFATE 1 MG/ML
INJECTION, SOLUTION INTRAVENOUS PRN
Status: DISCONTINUED | OUTPATIENT
Start: 2024-02-21 | End: 2024-02-21 | Stop reason: SDUPTHER

## 2024-02-21 RX ORDER — LIDOCAINE HYDROCHLORIDE 20 MG/ML
INJECTION, SOLUTION EPIDURAL; INFILTRATION; INTRACAUDAL; PERINEURAL PRN
Status: DISCONTINUED | OUTPATIENT
Start: 2024-02-21 | End: 2024-02-21 | Stop reason: SDUPTHER

## 2024-02-21 RX ORDER — SODIUM CHLORIDE, SODIUM LACTATE, POTASSIUM CHLORIDE, CALCIUM CHLORIDE 600; 310; 30; 20 MG/100ML; MG/100ML; MG/100ML; MG/100ML
INJECTION, SOLUTION INTRAVENOUS CONTINUOUS
Status: DISCONTINUED | OUTPATIENT
Start: 2024-02-21 | End: 2024-02-21 | Stop reason: HOSPADM

## 2024-02-21 RX ORDER — SODIUM CHLORIDE 9 MG/ML
INJECTION, SOLUTION INTRAVENOUS PRN
Status: DISCONTINUED | OUTPATIENT
Start: 2024-02-21 | End: 2024-02-21 | Stop reason: HOSPADM

## 2024-02-21 RX ORDER — FENTANYL CITRATE 50 UG/ML
INJECTION, SOLUTION INTRAMUSCULAR; INTRAVENOUS PRN
Status: DISCONTINUED | OUTPATIENT
Start: 2024-02-21 | End: 2024-02-21 | Stop reason: SDUPTHER

## 2024-02-21 RX ORDER — DEXAMETHASONE SODIUM PHOSPHATE 4 MG/ML
INJECTION, SOLUTION INTRA-ARTICULAR; INTRALESIONAL; INTRAMUSCULAR; INTRAVENOUS; SOFT TISSUE PRN
Status: DISCONTINUED | OUTPATIENT
Start: 2024-02-21 | End: 2024-02-21 | Stop reason: SDUPTHER

## 2024-02-21 RX ADMIN — ONDANSETRON 4 MG: 2 INJECTION INTRAMUSCULAR; INTRAVENOUS at 09:54

## 2024-02-21 RX ADMIN — FAMOTIDINE 20 MG: 10 INJECTION, SOLUTION INTRAVENOUS at 08:17

## 2024-02-21 RX ADMIN — MIDAZOLAM 2 MG: 1 INJECTION, SOLUTION INTRAMUSCULAR; INTRAVENOUS at 09:27

## 2024-02-21 RX ADMIN — SODIUM CHLORIDE, SODIUM LACTATE, POTASSIUM CHLORIDE, AND CALCIUM CHLORIDE: 600; 310; 30; 20 INJECTION, SOLUTION INTRAVENOUS at 08:16

## 2024-02-21 RX ADMIN — SODIUM CHLORIDE, SODIUM LACTATE, POTASSIUM CHLORIDE, AND CALCIUM CHLORIDE: 600; 310; 30; 20 INJECTION, SOLUTION INTRAVENOUS at 10:04

## 2024-02-21 RX ADMIN — Medication 3 MG: at 09:54

## 2024-02-21 RX ADMIN — ROCURONIUM BROMIDE 10 MG: 10 INJECTION, SOLUTION INTRAVENOUS at 09:37

## 2024-02-21 RX ADMIN — LIDOCAINE HYDROCHLORIDE 100 MG: 20 INJECTION, SOLUTION EPIDURAL; INFILTRATION; INTRACAUDAL; PERINEURAL at 09:34

## 2024-02-21 RX ADMIN — FENTANYL CITRATE 50 MCG: 50 INJECTION INTRAMUSCULAR; INTRAVENOUS at 09:34

## 2024-02-21 RX ADMIN — ROCURONIUM BROMIDE 5 MG: 10 INJECTION, SOLUTION INTRAVENOUS at 09:34

## 2024-02-21 RX ADMIN — PROPOFOL 200 MG: 10 INJECTION, EMULSION INTRAVENOUS at 09:34

## 2024-02-21 RX ADMIN — GLYCOPYRROLATE 0.4 MG: 0.2 INJECTION INTRAMUSCULAR; INTRAVENOUS at 09:54

## 2024-02-21 RX ADMIN — Medication 100 MG: at 09:34

## 2024-02-21 RX ADMIN — FENTANYL CITRATE 50 MCG: 50 INJECTION INTRAMUSCULAR; INTRAVENOUS at 09:37

## 2024-02-21 RX ADMIN — DEXAMETHASONE SODIUM PHOSPHATE 4 MG: 4 INJECTION INTRA-ARTICULAR; INTRALESIONAL; INTRAMUSCULAR; INTRAVENOUS; SOFT TISSUE at 09:37

## 2024-02-21 RX ADMIN — WATER 2000 MG: 1 INJECTION, SOLUTION INTRAMUSCULAR; INTRAVENOUS; SUBCUTANEOUS at 09:27

## 2024-02-21 ASSESSMENT — PAIN - FUNCTIONAL ASSESSMENT
PAIN_FUNCTIONAL_ASSESSMENT: NONE - DENIES PAIN
PAIN_FUNCTIONAL_ASSESSMENT: 0-10

## 2024-02-21 NOTE — PERIOP NOTE
Patient /Family /Designee has been informed that [facility] is not responsible for patient belongings per policy and the signed [facility] Patient Agreement document.  Personal items should be sent home or checked in with security.  Patient /Family /Designee selected the following action:                            [x]  Send personal items home with a family member or friend                                                 []  Check in personal items with security, excluding clothing                            []  Maintain personal items at the bedside, against recommendation                                 by Gary Maldonado [facility]                                   ** If patient /family /designee chooses to maintain personal items at the bedside,                                      Complete the patient belongings inventory in the EMR.   Belongings are with Shakira dickey.  Contact number: [phone].

## 2024-02-21 NOTE — TELEPHONE ENCOUNTER
Patient's wife calling requesting a letter to clear patient to go back to work.  [facility] patient had surgery this morning: Repair of left inguinal hernia  02/21/23.  [facility] patient will return to work in 2-3 weeks.  States need letter of clearance to provide to employer before returning to work.  I advised letter of clearance is provided at 2 week post op appt.  I advised will forward request to Dr. Sterling.  Wife verbalized understanding.

## 2024-02-21 NOTE — ANESTHESIA PRE PROCEDURE
Department of Anesthesiology  Preprocedure Note       Name:  Noman Verma   Age:  73 y.o.  :  1950                                          MRN:  [id number]         Date:  2024      Surgeon: Surgeon(s):  Yann Sterling MD    Procedure: Procedure(s):  ROBOTIC LEFT INGUINAL HERNIA REPAIR WITH PLACEMENT OF MESH    Medications prior to admission:   Prior to Admission medications    Medication Sig Start Date End Date Taking? Authorizing Provider   oxyCODONE-acetaminophen (PERCOCET) 5-325 MG per tablet Take 1 tablet by mouth every 6 hours as needed for Pain for up to 3 days. Intended supply: 3 days. Take lowest dose possible to manage pain Max Daily Amount: 4 tablets 24 Yes Yann Sterling MD       Current medications:    Current Facility-Administered Medications   Medication Dose Route Frequency Provider Last Rate Last Admin   • ceFAZolin (ANCEF) 2,000 mg in sterile water 20 mL IV syringe  2,000 mg IntraVENous Once Yann Sterling MD       • lidocaine PF 1 % injection 1 mL  1 mL IntraDERmal Once PRN Lala Flores APRN - CRNA       • lactated ringers IV soln infusion   IntraVENous Continuous Lala Flores APRN - CRNA 50 mL/hr at 24 0816 New Bag at 24 0816   • ROPivacaine (NAROPIN) 40 mg, dexAMETHasone (DECADRON) 5 mg, dexmedeTOMIDine (PRECEDEX) 25 mcg   Nerve block catheter Once Yann Sterling MD           Allergies:  No Known Allergies    Problem List:  There is no problem list on file for this patient.      Past Medical History:        Diagnosis Date   • Hernia of abdominal cavity        Past Surgical History:  History reviewed. No pertinent surgical history.    Social History:    Social History     Tobacco Use   • Smoking status: Some Days     Current packs/day: 0.25     Types: Cigarettes   • Smokeless tobacco: Never   • Tobacco comments:     Quit smoking: 3 cig a day   Substance Use Topics   • Alcohol use: Yes     Alcohol/week: 3.0 standard drinks of alcohol

## 2024-02-21 NOTE — BRIEF OP NOTE
Brief Postoperative Note      Patient: Noman Verma  YOB: 1950  MRN: [id number]    Date of Procedure: 2/21/2024    Pre-Op Diagnosis Codes:     * Left inguinal hernia [K40.90]    Post-Op Diagnosis: Same       Procedure(s):  ROBOTIC LEFT INGUINAL HERNIA REPAIR WITH PLACEMENT OF MESH    Surgeon(s):  Yann Sterling MD    Assistant:  Surgical Assistant: Melani Daniel    Anesthesia: General    Estimated Blood Loss (mL): Minimal    Complications: None    Specimens:   * No specimens in log *    Implants:  Implant Name Type Inv. Item Serial No.  Lot No. LRB No. Used Action   MESH CS LEFT LARGE 10CM X 16CM - [id number]  MESH CS LEFT LARGE 10CM X 16CM  BARD DAVOL-WD [id number] Left 1 Implanted         Drains: * No LDAs found *    Findings: left direct inguinal hernia.       Electronically signed by Yann Sterling MD on 2/21/2024 at 9:56 AM

## 2024-02-21 NOTE — PROGRESS NOTES
Update History & Physical    The patient's History and Physical was reviewed with the patient and I examined the patient. There was no change. The surgical site was confirmed by the patient and me.       Plan: The risks, benefits, expected outcome, and alternative to the recommended procedure have been discussed with the patient. Patient understands and wants to proceed with the procedure. Will proceed with robotic left inguinal hernia repair with placement of mesh.    Electronically signed by Yann Sterling MD on 2/21/2024 at 8:14 AM

## 2024-02-21 NOTE — DISCHARGE INSTRUCTIONS
Discharge Instructions Following Surgery    Patient: Noman Verma MRN: [id number]  SSN: [id number]    YOB: 1950  Age: 73 y.o.  Sex: male      Activity  As tolerated, walking encourage, stairs are okay.  Avoid strenuous activities - no lifting anything heavier than 15 pounds till seen in the clinic.  You may shower at home after 24 hours.  For the first 24 hours: do not Drive, Drink alcoholic beverages, or make important decisions.  Be aware of dizziness following anesthesia and while taking pain medication.    Diet and Medications  Regular diet after nausea from the anesthetic has passed.  Take pain medication as directed by your doctor.  Call your doctor if pain is NOT relieved by medication.    Wound and Dressing Care  There is glue on the wounds. No need for any dressing care.   Apply ice packs to the area of the surgery for the first 1 to 2 days  Apply warm compresses after 2 days for pain relieve if needed    Call your doctor if  Excessive bleeding that does not stop after holding pressure over the area.  Temperature of 101 degrees F or above.  Redness,excessive swelling or bruising, and/or green or yellow, smelly discharge from incision.  If nausea and vomiting continues.    Follow-Up    Call the office of Dr. Yann Sterling at [phone] to make your follow-up appointment.    Dr. Sterling's cell phone number is [phone]. Please call me if you have any concerns or questions.

## 2024-02-21 NOTE — ANESTHESIA POSTPROCEDURE EVALUATION
Department of Anesthesiology  Postprocedure Note    Patient: Noman Verma  MRN: [id number]  YOB: 1950  Date of evaluation: 2/21/2024    Procedure Summary       Date: 02/21/24 Room / Location: [facility] MAIN 04 / [facility] MAIN OR    Anesthesia Start: 0927 Anesthesia Stop: 1011    Procedure: ROBOTIC LEFT INGUINAL HERNIA REPAIR WITH PLACEMENT OF MESH (Left: Abdomen) Diagnosis:       Left inguinal hernia      (Left inguinal hernia [K40.90])    Surgeons: Yann Sterling MD Responsible Provider: Jonnie Spencer MD    Anesthesia Type: General ASA Status: 2            Anesthesia Type: General    Bev Phase I: Bev Score: 10    Bev Phase II: Bev Score: 10    Anesthesia Post Evaluation    Patient location during evaluation: bedside  Patient participation: complete - patient participated  Airway patency: patent  Cardiovascular status: hemodynamically stable  Respiratory status: acceptable  Hydration status: stable    No notable events documented.

## 2024-02-21 NOTE — OP NOTE
[facility]  OPERATIVE REPORT    Name:  ANABELL RIVERA  MR#:   [id number]  :  1950  ACCOUNT #:  [id number]  DATE OF SERVICE:  2024      PREOPERATIVE DIAGNOSIS:  Left inguinal hernia.    POSTOPERATIVE DIAGNOSIS:  Left direct inguinal hernia.    PROCEDURE PERFORMED:  Robotic repair of left inguinal hernia with placement of mesh.    SURGEON:  Yann Sterling MD    ASSISTANT:  Melani Bond.    ANESTHESIA:  General.    COMPLICATIONS:  None.    SPECIMENS REMOVED:  None.    IMPLANTS:  Left-sided large Bard 3-D mid mesh.    ESTIMATED BLOOD LOSS:  Minimal.    PROCEDURE:  The patient was brought to operating room.  Anesthesia was induced.  Scrubbing and draping of the abdomen were done in the usual manner.  A time-out was performed.  A skin incision in the supraumbilical area was performed.  Veress needle was inserted.  Saline drop test was performed.  Abdomen was insufflated.  An 8-mm port was inserted.  Abdomen was explored.  There was no injury from the Veress needle or port placement.  Under direct visualization, two other 8-mm ports were placed on the right and left sides of the abdominal wall and TAP block was performed bilaterally, and the patient was placed in Trendelenburg position, and at this point, the robot was docked.  There was no evidence of any injury and there was no bleeding.  There was evidence of a direct left inguinal hernia, so at this point, the peritoneum was opened in the left groin.  The preperitoneal space was dissected.  The inferior epigastric vessels were identified and protected, and the direct pseudosac was reduced completely, and the cord structure and the vas were identified and protected and the Paul ligament was seen.  We placed a large left side Bard 3-D mid mesh in the preperitoneal space and we closed the peritoneum with a 2-0 V-Loc suture in a running fashion to completely cover the mesh.  Hemostasis was secured.  The needle was taken out.  The abdomen was

## 2024-02-23 NOTE — TELEPHONE ENCOUNTER
Letter completed per Dr. Sterling.      M for patient to  letter from office or can call office with fax # to employer office to fax letter.

## 2024-03-09 NOTE — ED PROVIDER NOTES
the Emergency Department Physician who either signs or Co-signs this chart in the absence of a cardiologist.        RADIOLOGY:   Non-plain film images such as CT, Ultrasound and MRI are read by the radiologist. Plain radiographic images are visualized and preliminarily interpreted by the emergency physician with the below findings:        Interpretation per the Radiologist below, if available at the time of this note:    No orders to display         ED BEDSIDE ULTRASOUND:   Performed by ED Physician - none    LABS:  Labs Reviewed - No data to display    All other labs were within normal range or not returned as of this dictation.    EMERGENCY DEPARTMENT COURSE and DIFFERENTIAL DIAGNOSIS/MDM:   Vitals:    Vitals:    01/04/24 0536   BP: (!) 153/73   Pulse: 73   Resp: 14   Temp: 97.8 °F (36.6 °C)   TempSrc: Oral   SpO2: 97%   Weight: 77.1 kg (170 lb)   Height: 1.753 m (5' 9\")       Medical Decision Making  Risk  OTC drugs.  Prescription drug management.    Patient is a 73-year-old male presenting with 2 issues.  From standpoint of his ankle, he is neurovascular intact.  Has no concerning findings of trauma or injury.  He is able to ambulate on it.  He does have some slight discomfort with eversion of his foot more consistent with first or second-degree sparring.  Discussed different regimens to use to help treat his symptoms.  Discussed the importance of alternative therapies and anti-inflammatory properties.  Recommend ice elevation and ibuprofen.  Discussed follow-up with podiatry if symptoms persist.  However at this point no signs of trauma, infection or other significant process at this time.  As for the mass he feels in his abdomen this is consistent with a hernia.  It is reducible here on exam however patient will be given follow-up with general surgery to discuss surgery.  Discussed risks of strangulation and incarceration.    Patient stable for discharge at this time.  Patient is in agreement with the plan to

## 2024-03-11 ENCOUNTER — OFFICE VISIT (OUTPATIENT)
Age: 74
End: 2024-03-11
Payer: MEDICARE

## 2024-03-11 VITALS
WEIGHT: 220 LBS | HEART RATE: 83 BPM | DIASTOLIC BLOOD PRESSURE: 87 MMHG | TEMPERATURE: 98.2 F | SYSTOLIC BLOOD PRESSURE: 140 MMHG | BODY MASS INDEX: 32.58 KG/M2 | HEIGHT: 69 IN | OXYGEN SATURATION: 96 %

## 2024-03-11 DIAGNOSIS — K40.90 LEFT INGUINAL HERNIA: Primary | ICD-10-CM

## 2024-03-11 PROCEDURE — G8417 CALC BMI ABV UP PARAM F/U: HCPCS | Performed by: SURGERY

## 2024-03-11 PROCEDURE — 99024 POSTOP FOLLOW-UP VISIT: CPT | Performed by: SURGERY

## 2024-03-11 PROCEDURE — 4004F PT TOBACCO SCREEN RCVD TLK: CPT | Performed by: SURGERY

## 2024-03-11 PROCEDURE — G8427 DOCREV CUR MEDS BY ELIG CLIN: HCPCS | Performed by: SURGERY

## 2024-03-11 PROCEDURE — 1123F ACP DISCUSS/DSCN MKR DOCD: CPT | Performed by: SURGERY

## 2024-03-11 PROCEDURE — G8484 FLU IMMUNIZE NO ADMIN: HCPCS | Performed by: SURGERY

## 2024-03-11 PROCEDURE — 3017F COLORECTAL CA SCREEN DOC REV: CPT | Performed by: SURGERY

## 2024-03-11 NOTE — PROGRESS NOTES
Patient seen and examined.  He is doing well.  His abdomen is soft and nontender and his wounds are healing well.  Follow-up as needed

## 2024-05-01 ENCOUNTER — HOSPITAL ENCOUNTER (EMERGENCY)
Facility: HOSPITAL | Age: 74
Discharge: HOME | End: 2024-05-01
Attending: EMERGENCY MEDICINE
Payer: MEDICARE

## 2024-05-01 ENCOUNTER — APPOINTMENT (OUTPATIENT)
Facility: HOSPITAL | Age: 74
End: 2024-05-01
Payer: MEDICARE

## 2024-05-01 VITALS
WEIGHT: 220 LBS | BODY MASS INDEX: 32.58 KG/M2 | SYSTOLIC BLOOD PRESSURE: 170 MMHG | DIASTOLIC BLOOD PRESSURE: 88 MMHG | TEMPERATURE: 97.6 F | OXYGEN SATURATION: 99 % | RESPIRATION RATE: 18 BRPM | HEIGHT: 69 IN | HEART RATE: 78 BPM

## 2024-05-01 DIAGNOSIS — M70.21 OLECRANON BURSITIS OF RIGHT ELBOW: Primary | ICD-10-CM

## 2024-05-01 PROCEDURE — 99283 EMERGENCY DEPT VISIT LOW MDM: CPT

## 2024-05-01 PROCEDURE — 6370000000 HC RX 637 (ALT 250 FOR IP): Performed by: EMERGENCY MEDICINE

## 2024-05-01 PROCEDURE — 73080 X-RAY EXAM OF ELBOW: CPT

## 2024-05-01 RX ORDER — IBUPROFEN 400 MG/1
400 TABLET ORAL EVERY 6 HOURS PRN
Qty: 16 TABLET | Refills: 0 | Status: SHIPPED | OUTPATIENT
Start: 2024-05-01 | End: 2024-05-05

## 2024-05-01 RX ORDER — ACETAMINOPHEN 325 MG/1
650 TABLET ORAL
Status: COMPLETED | OUTPATIENT
Start: 2024-05-01 | End: 2024-05-01

## 2024-05-01 RX ORDER — CEPHALEXIN 500 MG/1
500 CAPSULE ORAL 4 TIMES DAILY
Qty: 28 CAPSULE | Refills: 0 | Status: SHIPPED | OUTPATIENT
Start: 2024-05-01 | End: 2024-05-08

## 2024-05-01 RX ADMIN — ACETAMINOPHEN 325MG 650 MG: 325 TABLET ORAL at 10:02

## 2024-05-01 ASSESSMENT — LIFESTYLE VARIABLES
HOW MANY STANDARD DRINKS CONTAINING ALCOHOL DO YOU HAVE ON A TYPICAL DAY: 3 OR 4
HOW OFTEN DO YOU HAVE A DRINK CONTAINING ALCOHOL: 2-4 TIMES A MONTH

## 2024-05-01 ASSESSMENT — PAIN - FUNCTIONAL ASSESSMENT
PAIN_FUNCTIONAL_ASSESSMENT: NONE - DENIES PAIN
PAIN_FUNCTIONAL_ASSESSMENT: 0-10

## 2024-05-01 ASSESSMENT — PAIN SCALES - GENERAL: PAINLEVEL_OUTOF10: 9

## 2024-05-01 NOTE — ED NOTES
Discharge instructions reviewed with patient.  Patient verbalized understanding.  Patient advised to follow up with provider as directed on discharge instructions. Patient reported a tolerable level of pain upon discharge.  Patient denies questions, needs or concerns at this time.  Patient verbalized understanding. Patient ambulated out of the ER with a steady gait.      done

## 2024-05-01 NOTE — DISCHARGE INSTRUCTIONS
Take the Motrin 3-4 times daily for the next 3 to 4 days.  You may also take Tylenol as needed.  Avoid contact/pressure on the area of the elbow.  Take the Keflex as directed.  As discussed, it is unclear as to whether this is inflammatory (more likely) versus infectious.  Have provided information for potential primary care at the family medicine clinic.  You can also follow-up with orthopedics early next week if not improving.  Return to the emergency department for progressive pain or swelling, fevers, or if concerned

## 2024-05-01 NOTE — ED PROVIDER NOTES
Kaden LYLE MD       PROCEDURES   Unless otherwise noted below, none     Procedures    CRITICAL CARE TIME   N/A    CONSULTS:  None    EMERGENCY DEPARTMENT COURSE and DIFFERENTIAL DIAGNOSIS/MDM:   Vitals:    Vitals:    05/01/24 0934   BP: (!) 170/88   Pulse: 78   Resp: 18   Temp: 97.6 °F (36.4 °C)   TempSrc: Oral   SpO2: 99%   Weight: 99.8 kg (220 lb)   Height: 1.753 m (5' 9\")       Patient was given the following medications:  Medications   acetaminophen (TYLENOL) tablet 650 mg (650 mg Oral Given 5/1/24 1002)            The patient tolerated their visit well.   Thepatient and / or the family were informed of the results of any tests, a time was given to answer questions.    I am the Primary Clinician of Record.    FINAL IMPRESSION      1. Olecranon bursitis of right elbow        DISPOSITION/PLAN   DISPOSITION Decision To Discharge 05/01/2024 10:53:03 AM      PATIENT REFERRED TO:  [facility]  [address] [place] [address] [phone]  Schedule an appointment as soon as possible for a visit in 2 days  For recheck    [facility]  [address], [address]  [facility] [address] [phone]  Schedule an appointment as soon as possible for a visit   4 to 5 days for reevaluation      DISCHARGE MEDICATIONS:  Discharge Medication List as of 5/1/2024 10:28 AM        START taking these medications    Details   ibuprofen (IBU) 400 MG tablet Take 1 tablet by mouth every 6 hours as needed for Pain, Disp-16 tablet, R-0Normal      cephALEXin (KEFLEX) 500 MG capsule Take 1 capsule by mouth 4 times daily for 7 days, Disp-28 capsule, R-0Normal             DISCONTINUED MEDICATIONS:  Discharge Medication List as of 5/1/2024 10:28 AM                 (Please note that portions of this note were completed with a voice recognition program.  Efforts were made to edit the dictations but occasionally words aremis-transcribed.)    Kaden Benavides MD (electronically

## 2024-10-09 ENCOUNTER — HOSPITAL ENCOUNTER (EMERGENCY)
Facility: HOSPITAL | Age: 74
Discharge: HOME | End: 2024-10-09
Payer: MEDICARE

## 2024-10-09 ENCOUNTER — APPOINTMENT (OUTPATIENT)
Facility: HOSPITAL | Age: 74
End: 2024-10-09
Payer: MEDICARE

## 2024-10-09 VITALS
HEIGHT: 69 IN | OXYGEN SATURATION: 99 % | WEIGHT: 175 LBS | SYSTOLIC BLOOD PRESSURE: 155 MMHG | DIASTOLIC BLOOD PRESSURE: 88 MMHG | RESPIRATION RATE: 18 BRPM | TEMPERATURE: 98 F | BODY MASS INDEX: 25.92 KG/M2 | HEART RATE: 86 BPM

## 2024-10-09 DIAGNOSIS — M79.672 LEFT FOOT PAIN: Primary | ICD-10-CM

## 2024-10-09 PROCEDURE — 6360000002 HC RX W HCPCS: Performed by: PHYSICIAN ASSISTANT

## 2024-10-09 PROCEDURE — 90471 IMMUNIZATION ADMIN: CPT | Performed by: PHYSICIAN ASSISTANT

## 2024-10-09 PROCEDURE — 90715 TDAP VACCINE 7 YRS/> IM: CPT | Performed by: PHYSICIAN ASSISTANT

## 2024-10-09 PROCEDURE — 73620 X-RAY EXAM OF FOOT: CPT

## 2024-10-09 PROCEDURE — 99284 EMERGENCY DEPT VISIT MOD MDM: CPT

## 2024-10-09 RX ORDER — ACETAMINOPHEN 500 MG
1000 TABLET ORAL EVERY 6 HOURS PRN
Qty: 56 TABLET | Refills: 0 | Status: SHIPPED | OUTPATIENT
Start: 2024-10-09 | End: 2024-10-16

## 2024-10-09 RX ADMIN — TETANUS TOXOID, REDUCED DIPHTHERIA TOXOID AND ACELLULAR PERTUSSIS VACCINE, ADSORBED 0.5 ML: 5; 2.5; 8; 8; 2.5 SUSPENSION INTRAMUSCULAR at 13:15

## 2024-10-09 ASSESSMENT — PAIN SCALES - GENERAL: PAINLEVEL_OUTOF10: 6

## 2024-10-09 ASSESSMENT — ENCOUNTER SYMPTOMS
NAUSEA: 0
ABDOMINAL PAIN: 0
DIARRHEA: 0
SHORTNESS OF BREATH: 0
VOMITING: 0

## 2024-10-09 ASSESSMENT — PAIN DESCRIPTION - LOCATION: LOCATION: FOOT

## 2024-10-09 ASSESSMENT — PAIN - FUNCTIONAL ASSESSMENT: PAIN_FUNCTIONAL_ASSESSMENT: 0-10

## 2024-10-09 NOTE — ED PROVIDER NOTES
EMERGENCY DEPARTMENT HISTORY AND PHYSICAL EXAM    2:23 PM      Date: 10/9/2024  Patient Name: Noman Verma    History of Presenting Illness     Chief Complaint   Patient presents with    stepped on earring          History Provided By: Patient    Additional History (Context): Noman Verma is a 74 y.o. male with   Past Medical History:   Diagnosis Date    Hernia of abdominal cavity    } who presents with complaint of left foot pain after accidentally stepping on an earring last night.  Patient notes he was wearing socks when the incident happened.  Patient notes his tetanus shot is not up-to-date.  Patient notes he did not take any medication for the symptoms prior to arrival.  Patient denies fever or chills, drainage, wound, numbness or tingling.  Patient denies history of diabetes.  Denies any other concerns or complaints.    PCP: No primary care provider on file.    No current facility-administered medications for this encounter.     Current Outpatient Medications   Medication Sig Dispense Refill    acetaminophen (TYLENOL) 500 MG tablet Take 2 tablets by mouth every 6 hours as needed for Pain 56 tablet 0       Past History     Past Medical History:  Past Medical History:   Diagnosis Date    Hernia of abdominal cavity        Past Surgical History:  Past Surgical History:   Procedure Laterality Date    HERNIA REPAIR Left 2/21/2024    ROBOTIC LEFT INGUINAL HERNIA REPAIR WITH PLACEMENT OF MESH performed by Yann Sterling MD at [facility] MAIN OR       Family History:  History reviewed. No pertinent family history.    Social History:  Social History     Tobacco Use    Smoking status: Some Days     Current packs/day: 0.25     Types: Cigarettes    Smokeless tobacco: Never    Tobacco comments:     Quit smoking: 3 cig a day   Vaping Use    Vaping status: Never Used   Substance Use Topics    Alcohol use: Yes     Alcohol/week: 3.0 standard drinks of alcohol     Types: 3 Shots of liquor per week     Comment: three times a week

## 2024-10-09 NOTE — DISCHARGE INSTRUCTIONS
Take medication as prescribed. Follow-up with your primary care physician within 2 days for reassessment. Bring the results from this visit with you for their review. Return to the ED immediately for any new, worsening, or persistent symptoms, including fever, drainage, or any other medical concerns.

## 2024-10-17 ENCOUNTER — APPOINTMENT (OUTPATIENT)
Facility: HOSPITAL | Age: 74
End: 2024-10-17
Payer: MEDICARE

## 2024-10-17 ENCOUNTER — HOSPITAL ENCOUNTER (EMERGENCY)
Facility: HOSPITAL | Age: 74
Discharge: HOME | End: 2024-10-17
Attending: EMERGENCY MEDICINE
Payer: MEDICARE

## 2024-10-17 VITALS
SYSTOLIC BLOOD PRESSURE: 164 MMHG | RESPIRATION RATE: 16 BRPM | WEIGHT: 170 LBS | TEMPERATURE: 98 F | BODY MASS INDEX: 25.18 KG/M2 | DIASTOLIC BLOOD PRESSURE: 89 MMHG | HEART RATE: 70 BPM | HEIGHT: 69 IN | OXYGEN SATURATION: 99 %

## 2024-10-17 DIAGNOSIS — M25.531 RIGHT WRIST PAIN: ICD-10-CM

## 2024-10-17 DIAGNOSIS — M24.231 DISORDER OF LIGAMENT OF RIGHT WRIST: ICD-10-CM

## 2024-10-17 DIAGNOSIS — M10.9 ACUTE GOUT OF RIGHT WRIST, UNSPECIFIED CAUSE: Primary | ICD-10-CM

## 2024-10-17 PROCEDURE — 73110 X-RAY EXAM OF WRIST: CPT

## 2024-10-17 PROCEDURE — 99283 EMERGENCY DEPT VISIT LOW MDM: CPT

## 2024-10-17 PROCEDURE — 6370000000 HC RX 637 (ALT 250 FOR IP): Performed by: EMERGENCY MEDICINE

## 2024-10-17 RX ORDER — PREDNISONE 5 MG/1
TABLET ORAL
Qty: 21 EACH | Refills: 0 | Status: SHIPPED | OUTPATIENT
Start: 2024-10-17

## 2024-10-17 RX ORDER — COLCHICINE 0.6 MG/1
0.6 CAPSULE ORAL ONCE
Qty: 1 CAPSULE | Refills: 0 | Status: SHIPPED | OUTPATIENT
Start: 2024-10-17 | End: 2024-10-17

## 2024-10-17 RX ORDER — LIDOCAINE 50 MG/G
OINTMENT TOPICAL
Qty: 50 G | Refills: 0 | Status: SHIPPED | OUTPATIENT
Start: 2024-10-17

## 2024-10-17 RX ORDER — COLCHICINE 0.6 MG/1
1.2 CAPSULE ORAL
Status: COMPLETED | OUTPATIENT
Start: 2024-10-17 | End: 2024-10-17

## 2024-10-17 RX ADMIN — COLCHICINE 1.2 MG: 0.6 CAPSULE ORAL at 11:28

## 2024-10-17 ASSESSMENT — LIFESTYLE VARIABLES
HOW MANY STANDARD DRINKS CONTAINING ALCOHOL DO YOU HAVE ON A TYPICAL DAY: 1 OR 2
HOW OFTEN DO YOU HAVE A DRINK CONTAINING ALCOHOL: 2-3 TIMES A WEEK

## 2024-10-17 ASSESSMENT — ENCOUNTER SYMPTOMS
NAUSEA: 0
COLOR CHANGE: 0
VOMITING: 0
BACK PAIN: 0
SHORTNESS OF BREATH: 0

## 2024-10-17 ASSESSMENT — PAIN - FUNCTIONAL ASSESSMENT: PAIN_FUNCTIONAL_ASSESSMENT: 0-10

## 2024-10-17 ASSESSMENT — PAIN DESCRIPTION - LOCATION: LOCATION: WRIST

## 2024-10-17 ASSESSMENT — PAIN DESCRIPTION - ORIENTATION: ORIENTATION: RIGHT

## 2024-10-17 ASSESSMENT — PAIN SCALES - GENERAL
PAINLEVEL_OUTOF10: 9
PAINLEVEL_OUTOF10: 5

## 2024-10-17 NOTE — ED TRIAGE NOTES
Pt to ED for eval of right wrist pain x 2 days. Pt reports unknown injury, suffers from nightmares which sometimes cause him to fall out of the bed onto the floor. Denies any other pain.

## 2024-10-18 ENCOUNTER — HOSPITAL ENCOUNTER (EMERGENCY)
Facility: HOSPITAL | Age: 74
Discharge: HOME | End: 2024-10-18
Attending: EMERGENCY MEDICINE
Payer: MEDICARE

## 2024-10-18 VITALS
SYSTOLIC BLOOD PRESSURE: 178 MMHG | RESPIRATION RATE: 16 BRPM | HEART RATE: 82 BPM | DIASTOLIC BLOOD PRESSURE: 88 MMHG | WEIGHT: 178 LBS | HEIGHT: 60 IN | OXYGEN SATURATION: 97 % | BODY MASS INDEX: 34.95 KG/M2

## 2024-10-18 DIAGNOSIS — M25.531 RIGHT WRIST PAIN: Primary | ICD-10-CM

## 2024-10-18 DIAGNOSIS — M79.641 RIGHT HAND PAIN: ICD-10-CM

## 2024-10-18 DIAGNOSIS — M24.231 DISORDER OF LIGAMENT OF RIGHT WRIST: ICD-10-CM

## 2024-10-18 DIAGNOSIS — R03.0 ELEVATED BLOOD PRESSURE READING: ICD-10-CM

## 2024-10-18 PROCEDURE — 99282 EMERGENCY DEPT VISIT SF MDM: CPT

## 2024-10-18 ASSESSMENT — PAIN - FUNCTIONAL ASSESSMENT: PAIN_FUNCTIONAL_ASSESSMENT: 0-10

## 2024-10-18 ASSESSMENT — PAIN SCALES - GENERAL: PAINLEVEL_OUTOF10: 6

## 2024-10-18 NOTE — ED TRIAGE NOTES
Pt is returning from a call back from the provider from yesterday's visit here. Pt was seen for possible gout flare up of right wrist.

## 2024-10-18 NOTE — ED PROVIDER NOTES
[facility] EMERGENCY DEPT  EMERGENCY DEPARTMENT HISTORY AND PHYSICAL EXAM      Pt Name: Noman Verma  MRN: [id number]  Birthdate 1950  Date of evaluation: 10/17/2024  Provider: Jatinder Garlnad DO    CHIEF COMPLAINT       Chief Complaint   Patient presents with    Wrist Pain         HISTORY OF PRESENT ILLNESS   (Location/Symptom, Timing/Onset, Context/Setting, Quality, Duration, Modifying Factors, Severity)  Note limiting factors.   Noman Verma is a 74 y.o. male with past medical history of gout who presents to the emergency department chief complaint of pain right wrist pain for the past 3 days.  No trauma.  He does admit to eating less red meat recently.  No fever, numbness, weakness, chest pain, shortness of breath no other complaints.    The history is provided by the patient. No  was used.       Nursing Notes were reviewed.    REVIEW OF SYSTEMS    (2-9 systems for level 4, 10 or more for level 5)     Review of Systems   Constitutional:  Negative for chills and fever.   Eyes:  Negative for visual disturbance.   Respiratory:  Negative for shortness of breath.    Cardiovascular:  Negative for chest pain and palpitations.   Gastrointestinal:  Negative for nausea and vomiting.   Endocrine: Negative for cold intolerance and heat intolerance.   Genitourinary:  Negative for dysuria.   Musculoskeletal:  Positive for arthralgias. Negative for back pain and joint swelling.   Skin:  Negative for color change.   Neurological:  Negative for dizziness, weakness and numbness.   Psychiatric/Behavioral:  Negative for confusion.    All other systems reviewed and are negative.      Except as noted above the remainder of the review of systems was reviewed and negative.       PAST MEDICAL HISTORY     Past Medical History:   Diagnosis Date    Hernia of abdominal cavity          SURGICAL HISTORY       Past Surgical History:   Procedure Laterality Date    HERNIA REPAIR Left 2/21/2024    ROBOTIC LEFT INGUINAL HERNIA

## 2024-10-20 NOTE — ED PROVIDER NOTES
Performed by ED Physician - none    LABS:  No results found for this or any previous visit (from the past 24 hour(s)).      All other labs were within normal range or not returned as of this dictation.      ED Medication Orders (From admission, onward)      None             EMERGENCY DEPARTMENT COURSE and DIFFERENTIAL DIAGNOSIS/MDM:   Vitals:    Vitals:    10/18/24 0857   BP: (!) 178/88   Pulse: 82   Resp: 16   SpO2: 97%   Weight: 80.7 kg (178 lb)   Height: 1.524 m (5')       Ddx possible wrist ligament injury, gout, oa    Medical Decision Making    Pt returned to ED to get splint as was called to do so.       Pre and post velcro splint: NV intact    Has elevated BP but asymptomatic. Will have him follow pcp referral. Get repeat bp which cough just be from his pain    Stable for discharge. Diagnoses: right   hand/wrist pain, wrist ligament disorder or injury. Follow up ortho.      REASSESSMENT        Reassessed patient and discussed diagnoses and discharge plan for follow up with orthopedist. He understands plan. Return to the ER if any worsening symptoms or concerns       CONSULTS:  None    PROCEDURES:  Unless otherwise noted below, none     Procedures        FINAL IMPRESSION      1. Right wrist pain    2. Disorder of ligament of right wrist    3. Right hand pain    4. Elevated blood pressure reading          DISPOSITION/PLAN   DISPOSITION Decision To Discharge 10/18/2024 10:53:37 AM      PATIENT REFERRED TO:  Chauncey Kirby DO  [address]  [facility] [address] [phone]    Schedule an appointment as soon as possible for a visit in 3 days      [facility]  [address]  [facility] [address] [phone]  Schedule an appointment as soon as possible for a visit in 1 week  Get repeat blood pressure check    [facility] EMERGENCY DEPT  [address] [address] [phone]    As needed, If symptoms worsen      DISCHARGE

## 2024-10-22 NOTE — PROGRESS NOTES
Home readings- not testing recently- will encourage to check 2x week   Noman Verma is a 73 y.o. male (: 1950) presenting to address:    Chief Complaint   Patient presents with    Post-Op Check     Repair of left inguinal hernia with left sided large bard 3D mis mesh 24       Medication list and allergies have been reviewed with Noman Bayron and updated as of today's date.     I have gone over all Medical, Surgical and Social History with Noman Verma and updated/added the information accordingly.       1. Have you been to the ER, Urgent Care or Hospitalized since your last visit? No          2. Have you followed up with your PCP or any other Physicians since your procedure/ last office visit?   No

## 2024-11-06 ENCOUNTER — OFFICE VISIT (OUTPATIENT)
Age: 74
End: 2024-11-06
Payer: MEDICARE

## 2024-11-06 VITALS — WEIGHT: 223 LBS | BODY MASS INDEX: 33.03 KG/M2 | HEIGHT: 69 IN

## 2024-11-06 DIAGNOSIS — M10.9 GOUT OF RIGHT HAND, UNSPECIFIED CAUSE, UNSPECIFIED CHRONICITY: Primary | ICD-10-CM

## 2024-11-06 DIAGNOSIS — S63.399A RUPTURE OF SCAPHOLUNATE LIGAMENT: ICD-10-CM

## 2024-11-06 PROCEDURE — G8427 DOCREV CUR MEDS BY ELIG CLIN: HCPCS

## 2024-11-06 PROCEDURE — 99203 OFFICE O/P NEW LOW 30 MIN: CPT

## 2024-11-06 PROCEDURE — 4004F PT TOBACCO SCREEN RCVD TLK: CPT

## 2024-11-06 PROCEDURE — 1160F RVW MEDS BY RX/DR IN RCRD: CPT

## 2024-11-06 PROCEDURE — 1125F AMNT PAIN NOTED PAIN PRSNT: CPT

## 2024-11-06 PROCEDURE — 1123F ACP DISCUSS/DSCN MKR DOCD: CPT

## 2024-11-06 PROCEDURE — 3017F COLORECTAL CA SCREEN DOC REV: CPT

## 2024-11-06 PROCEDURE — G8484 FLU IMMUNIZE NO ADMIN: HCPCS

## 2024-11-06 PROCEDURE — G8417 CALC BMI ABV UP PARAM F/U: HCPCS

## 2024-11-06 PROCEDURE — 1159F MED LIST DOCD IN RCRD: CPT

## 2024-11-06 PROCEDURE — 1126F AMNT PAIN NOTED NONE PRSNT: CPT

## 2024-11-06 RX ORDER — METHYLPREDNISOLONE 4 MG/1
TABLET ORAL
Qty: 1 KIT | Refills: 0 | Status: SHIPPED | OUTPATIENT
Start: 2024-11-06 | End: 2024-11-12

## 2024-11-06 NOTE — PROGRESS NOTES
Noman Verma is a 74 y.o. male right handed retiree.  Worker's Compensation and legal considerations: none    Chief Complaint   Patient presents with    Wrist Pain     Right     Pain Score:  10 - Worst pain ever    Subjective:     Initial HPI: Patient presents today with a concern of right hand and wrist pain/swelling over the last 3 weeks.  He is unsure if he had an acute injury however he does note that he unfortunately has nightmares and will fall out of the bed onto the floor on occasion.  He may have done this around the same time that his symptoms started.  He was given prednisone and colchicine in the ED on 10/17 which he reports to have significantly improved his symptoms. He does not use ice/heat at home.    Of note, he also has a history of gout and has documented flares of his right hand and wrist in the past as recent as 1/2023.  Also reports flares in his feet and knees. He is not on gout prophylaxis.    Date of onset:  Acute on chronic  Injury: Unclear  Prior Treatment:  Yes: Comment: Colchicine, Prednisone  Contributory history: Gout    ROS: Review of Systems - General ROS: negative except HPI    Past Medical History:   Diagnosis Date    Hernia of abdominal cavity        Past Surgical History:   Procedure Laterality Date    HERNIA REPAIR Left 2/21/2024    ROBOTIC LEFT INGUINAL HERNIA REPAIR WITH PLACEMENT OF MESH performed by Yann Sterling MD at [facility] MAIN OR        Current Outpatient Medications   Medication Sig Dispense Refill    diclofenac sodium (VOLTAREN) 1 % GEL Apply 2 g topically 4 times daily for 15 days 120 g 0    methylPREDNISolone (MEDROL DOSEPACK) 4 MG tablet Take by mouth. 1 kit 0    colchicine (MITIGARE) 0.6 MG capsule Take 1 capsule by mouth once for 1 dose Take today immediately upon  1 capsule 0    predniSONE 5 MG (21) TBPK Take as prescribed, for acute gout flareup 21 each 0    lidocaine (XYLOCAINE) 5 % ointment Apply moderate amount topically every 12 hours as needed

## 2025-03-10 ENCOUNTER — HOSPITAL ENCOUNTER (EMERGENCY)
Facility: HOSPITAL | Age: 75
Discharge: HOME | End: 2025-03-10
Attending: STUDENT IN AN ORGANIZED HEALTH CARE EDUCATION/TRAINING PROGRAM
Payer: MEDICARE

## 2025-03-10 VITALS
WEIGHT: 260 LBS | OXYGEN SATURATION: 100 % | DIASTOLIC BLOOD PRESSURE: 86 MMHG | HEART RATE: 93 BPM | HEIGHT: 71 IN | SYSTOLIC BLOOD PRESSURE: 171 MMHG | RESPIRATION RATE: 18 BRPM | BODY MASS INDEX: 36.4 KG/M2 | TEMPERATURE: 97.5 F

## 2025-03-10 DIAGNOSIS — M10.471: Primary | ICD-10-CM

## 2025-03-10 PROCEDURE — 99283 EMERGENCY DEPT VISIT LOW MDM: CPT

## 2025-03-10 PROCEDURE — 6370000000 HC RX 637 (ALT 250 FOR IP): Performed by: STUDENT IN AN ORGANIZED HEALTH CARE EDUCATION/TRAINING PROGRAM

## 2025-03-10 RX ORDER — LIDOCAINE 4 G/G
1 PATCH TOPICAL
Status: DISCONTINUED | OUTPATIENT
Start: 2025-03-10 | End: 2025-03-10 | Stop reason: HOSPADM

## 2025-03-10 RX ORDER — COLCHICINE 0.6 MG/1
1.2 CAPSULE ORAL ONCE
Status: COMPLETED | OUTPATIENT
Start: 2025-03-10 | End: 2025-03-10

## 2025-03-10 RX ORDER — LIDOCAINE 50 MG/G
1 PATCH TOPICAL DAILY
Qty: 10 PATCH | Refills: 0 | Status: SHIPPED | OUTPATIENT
Start: 2025-03-10 | End: 2025-03-20

## 2025-03-10 RX ORDER — NAPROXEN 500 MG/1
500 TABLET ORAL 2 TIMES DAILY PRN
Qty: 28 TABLET | Refills: 0 | Status: SHIPPED | OUTPATIENT
Start: 2025-03-10

## 2025-03-10 RX ADMIN — COLCHICINE 1.2 MG: 0.6 CAPSULE ORAL at 14:11

## 2025-03-10 ASSESSMENT — LIFESTYLE VARIABLES
HOW OFTEN DO YOU HAVE A DRINK CONTAINING ALCOHOL: 2-3 TIMES A WEEK
HOW MANY STANDARD DRINKS CONTAINING ALCOHOL DO YOU HAVE ON A TYPICAL DAY: 1 OR 2

## 2025-03-10 ASSESSMENT — PAIN SCALES - GENERAL
PAINLEVEL_OUTOF10: 9

## 2025-03-10 ASSESSMENT — PAIN - FUNCTIONAL ASSESSMENT
PAIN_FUNCTIONAL_ASSESSMENT: 0-10
PAIN_FUNCTIONAL_ASSESSMENT: 0-10

## 2025-03-10 NOTE — ED PROVIDER NOTES
[facility] EMERGENCY DEPARTMENT  EMERGENCY DEPARTMENT ENCOUNTER      Pt Name: Noman Verma  MRN: [id number]  Birthdate 1950  Date of evaluation: 3/10/2025  Provider: Lu Rojas MD    CHIEF COMPLAINT       Chief Complaint   Patient presents with    Toe Pain         HISTORY OF PRESENT ILLNESS   (Location/Symptom, Timing/Onset, Context/Setting, Quality, Duration, Modifying Factors, Severity)  Note limiting factors.   Noman Verma is a 74 y.o. male who presents to the emergency department for right midfoot pain.  Started a couple days ago after he ate a bunch of seafood.  He states it feels like gout which he has had in the past but he is out of any medications at home.  Not been taking anything for it except Tylenol which does not seem to be helping.  He has been using a Ortho shoe which helps slightly.  Denies any trauma or injury.  Denies any changes in activity.  Denies any new issues.  He states states started to get swollen so he decided to come to the emergency department.  Denies any known medical problems.     Nursing Notes were reviewed.    REVIEW OF SYSTEMS    (2-9 systems for level 4, 10 or more for level 5)     Constitutional: No fever  Respiratory: No SOB  Cardio: No chest pain  GI: No blood in stool  : No hematuria  Neuro: No headache    Except as noted above the remainder of the review of systems was reviewed and negative.       PAST MEDICAL HISTORY     Past Medical History:   Diagnosis Date    Gout     Hernia of abdominal cavity          SURGICAL HISTORY       Past Surgical History:   Procedure Laterality Date    HERNIA REPAIR Left 2/21/2024    ROBOTIC LEFT INGUINAL HERNIA REPAIR WITH PLACEMENT OF MESH performed by Yann Sterling MD at [facility] MAIN OR         CURRENT MEDICATIONS       Previous Medications    ACETAMINOPHEN (TYLENOL) 500 MG TABLET    Take 2 tablets by mouth every 6 hours as needed for Pain    COLCHICINE (MITIGARE) 0.6 MG CAPSULE    Take 1 capsule by mouth once

## 2025-03-10 NOTE — ED TRIAGE NOTES
Patient ambulated to room with steady gait. Complains of gout caused by eating fish on Thursday, with swelling, rating 9/10, sharp, constant, worsens when bending toe. OTC with no relief.    Denies injury/fall

## 2025-03-10 NOTE — DISCHARGE INSTRUCTIONS
Recommend taking extra strength Tylenol every 4-6 hours and ibuprofen 600 mg every 6 hours as needed for pain and discomfort.  Also consider using lidocaine patches to the area of discomfort.  Use ice on for 10 minutes off appointments often as possible to help with swelling.  Keep foot elevated when you are sitting around.    Of note your blood pressure is elevated today.  Please follow-up with your primary care physician for evaluation of your blood pressure.

## 2025-05-01 ENCOUNTER — OFFICE VISIT (OUTPATIENT)
Age: 75
End: 2025-05-01
Payer: MEDICARE

## 2025-05-01 DIAGNOSIS — M10.071 ACUTE IDIOPATHIC GOUT OF RIGHT FOOT: Primary | ICD-10-CM

## 2025-05-01 DIAGNOSIS — N28.9 RENAL INSUFFICIENCY: ICD-10-CM

## 2025-05-01 PROCEDURE — 1160F RVW MEDS BY RX/DR IN RCRD: CPT | Performed by: ORTHOPAEDIC SURGERY

## 2025-05-01 PROCEDURE — 3017F COLORECTAL CA SCREEN DOC REV: CPT | Performed by: ORTHOPAEDIC SURGERY

## 2025-05-01 PROCEDURE — 4004F PT TOBACCO SCREEN RCVD TLK: CPT | Performed by: ORTHOPAEDIC SURGERY

## 2025-05-01 PROCEDURE — 1123F ACP DISCUSS/DSCN MKR DOCD: CPT | Performed by: ORTHOPAEDIC SURGERY

## 2025-05-01 PROCEDURE — 1125F AMNT PAIN NOTED PAIN PRSNT: CPT | Performed by: ORTHOPAEDIC SURGERY

## 2025-05-01 PROCEDURE — 1159F MED LIST DOCD IN RCRD: CPT | Performed by: ORTHOPAEDIC SURGERY

## 2025-05-01 PROCEDURE — G8417 CALC BMI ABV UP PARAM F/U: HCPCS | Performed by: ORTHOPAEDIC SURGERY

## 2025-05-01 PROCEDURE — 99214 OFFICE O/P EST MOD 30 MIN: CPT | Performed by: ORTHOPAEDIC SURGERY

## 2025-05-01 PROCEDURE — G8427 DOCREV CUR MEDS BY ELIG CLIN: HCPCS | Performed by: ORTHOPAEDIC SURGERY

## 2025-05-01 RX ORDER — METHYLPREDNISOLONE 4 MG/1
TABLET ORAL
Qty: 1 KIT | Refills: 0 | Status: SHIPPED | OUTPATIENT
Start: 2025-05-01

## 2025-05-01 RX ORDER — FAMOTIDINE 20 MG/1
20 TABLET, FILM COATED ORAL DAILY
Qty: 30 TABLET | Refills: 1 | Status: SHIPPED | OUTPATIENT
Start: 2025-05-01

## 2025-05-01 NOTE — PROGRESS NOTES
Noman Verma   [address] [address]       DIAGNOSTIC IMAGING      X-rays 2 view left foot, AP, lateral: There is hallux valgus, there is some mild OA of the left great toe first MTP, I see no focal osteolytic, or osteoblastic lesions..  I reviewed these images, also reviewed the x-ray report, from the [facility] radiologist. KENA Majano MD    5/1/2025 7:52 AM         XR FOOT LEFT (2 VIEWS) [[id number]]  Status: Final result     PACS Images     Show images for XR FOOT LEFT (2 VIEWS)  XR FOOT LEFT (2 VIEWS)  Order: [id number]   Status: Final result       Next appt: None    Test Result Released: No    0 Result Notes  Details    Reading Physician Reading Date Result Priority   Luis Singh MD  [phone]     10/9/2024      Narrative & Impression  EXAM: XR FOOT LEFT (2 VIEWS)     CLINICAL INDICATION/HISTORY: stepped on earring, no evidenc of retained FB     TECHNIQUE: 2 views of the left foot     COMPARISON: None     FINDINGS:     No fracture. Metatarsus primus varus and hallux valgus. Mild first MTP joint  osteoarthritis. Negative soft tissues. No radiopaque foreign body.        IMPRESSION:     No acute osseous abnormality.           Electronically signed by Luis Singh        Exam Ended: 10/09/24 13:07 EDT       Michoacano Majano MD  5/1/2025  7:50 AM

## 2025-05-01 NOTE — PROGRESS NOTES
AMBULATORY PROGRESS NOTE      Patient: Noman Verma             MRN: [id number]     SSN: [id number] There is no height or weight on file to calculate BMI.  YOB: 1950     AGE: 74 y.o.       EX: male  OFFICE VISIT DATE: 5/1/2025     PCP: No primary care provider on file.       IMPRESSION //  DIAGNOSIS AND TREATMENT PLAN 5/1/2025       Noman Verma has a diagnosis of:       DIAGNOSES    1. Acute idiopathic gout of right foot    2. Renal insufficiency          PLAN:    1. Advised to drink plenty of water to prevent dehydration.  2. Ordered Medrol Dosepak, take as directed, Pepcid, 20 mg, 1 p.o. each day  3.  Postop hard soled shoe, given to him, for his left foot.  4.  Renal insufficiency: I will not write for any colchicine, due to his renal insufficiency.    RTO 5/15/2025    Orders Placed This Encounter    methylPREDNISolone (MEDROL, MICH,) 4 MG tablet     Sig: Take by mouth.     Dispense:  1 kit     Refill:  0    famotidine (PEPCID) 20 MG tablet     Sig: Take 1 tablet by mouth daily     Dispense:  30 tablet     Refill:  1    DME Order for (Specify) as OP     - DME device ordered - Hard Soled Shoe        There are no Patient Instructions on file for this visit.      Please follow up with your PCP for any health maintenance as recommended.         Noman Verma  expresses understanding of the diagnosis, treatment plan, and all of their proposed questions were answered to their satisfaction. Patient education has been provided re the diagnoses.         HPI //  OBJECTIVE EXAMINATION      Noman Verma IS A 74 y.o. male who is a/an  new patient, presenting to my outpatient office for evaluation of  the following chief complaint(s):     Chief Complaint   Patient presents with    Foot Pain     Bilateral feet (L>R)       Patient presents today for right foot pain. Patient was seen at [facility] ED on 03/10/2025 with c/o gout flare in right 1st toe. Given dose of colchicine and

## 2025-05-15 ENCOUNTER — OFFICE VISIT (OUTPATIENT)
Age: 75
End: 2025-05-15
Payer: MEDICARE

## 2025-05-15 DIAGNOSIS — M10.071 ACUTE IDIOPATHIC GOUT OF RIGHT FOOT: Primary | ICD-10-CM

## 2025-05-15 PROCEDURE — 99213 OFFICE O/P EST LOW 20 MIN: CPT | Performed by: ORTHOPAEDIC SURGERY

## 2025-05-15 PROCEDURE — 1159F MED LIST DOCD IN RCRD: CPT | Performed by: ORTHOPAEDIC SURGERY

## 2025-05-15 PROCEDURE — 1123F ACP DISCUSS/DSCN MKR DOCD: CPT | Performed by: ORTHOPAEDIC SURGERY

## 2025-05-15 PROCEDURE — 1160F RVW MEDS BY RX/DR IN RCRD: CPT | Performed by: ORTHOPAEDIC SURGERY

## 2025-05-15 PROCEDURE — 1125F AMNT PAIN NOTED PAIN PRSNT: CPT | Performed by: ORTHOPAEDIC SURGERY

## 2025-05-15 PROCEDURE — 4004F PT TOBACCO SCREEN RCVD TLK: CPT | Performed by: ORTHOPAEDIC SURGERY

## 2025-05-15 PROCEDURE — 3017F COLORECTAL CA SCREEN DOC REV: CPT | Performed by: ORTHOPAEDIC SURGERY

## 2025-05-15 PROCEDURE — G8417 CALC BMI ABV UP PARAM F/U: HCPCS | Performed by: ORTHOPAEDIC SURGERY

## 2025-05-15 PROCEDURE — G8427 DOCREV CUR MEDS BY ELIG CLIN: HCPCS | Performed by: ORTHOPAEDIC SURGERY

## 2025-05-15 RX ORDER — FAMOTIDINE 20 MG/1
20 TABLET, FILM COATED ORAL 2 TIMES DAILY
Qty: 60 TABLET | Refills: 3 | Status: SHIPPED | OUTPATIENT
Start: 2025-05-15

## 2025-05-15 RX ORDER — METHYLPREDNISOLONE 4 MG/1
TABLET ORAL
Qty: 1 KIT | Refills: 0 | Status: SHIPPED | OUTPATIENT
Start: 2025-05-15 | End: 2025-05-21

## 2025-05-15 NOTE — PATIENT INSTRUCTIONS
Orders Placed This Encounter   Procedures    External Referral To Rheumatology     Referral Priority:   Routine     Referral Type:   Eval and Treat     Referral Reason:   Specialty Services Required     Referred to Provider:   Francoise Palacios MD     Requested Specialty:   Rheumatology     Number of Visits Requested:   1

## 2025-05-15 NOTE — PROGRESS NOTES
AMBULATORY PROGRESS NOTE      Patient: Noman Verma             MRN: [id number]     SSN: [id number] There is no height or weight on file to calculate BMI.  YOB: 1950     AGE: 74 y.o.       EX: male  OFFICE VISIT DATE OF SERVICE:  5/15/25     PCP: No primary care provider on file.       IMPRESSION //  DIAGNOSIS AND TREATMENT PLAN  5/15/2025       Noman Verma has a diagnosis of:      He is in exacerbation, acute gout, has renal insufficiency.  Plans listed as below    DIAGNOSES    1. Acute idiopathic gout of right foot          PLAN:    1. Ordered repeat Medrol Dosepak, take as directed, Pepcid, 20 mg, 1 p.o. each day.   2. Referral to Rheumatologist for reoccurring gout.   3. Given information pamphlet on gout including triggers and treatments.     RTO PRN    Orders Placed This Encounter    External Referral To Rheumatology     Referral Priority:   Routine     Referral Type:   Eval and Treat     Referral Reason:   Specialty Services Required     Referred to Provider:   Francoise Palacios MD     Requested Specialty:   Rheumatology     Number of Visits Requested:   1    methylPREDNISolone (MEDROL, MICH,) 4 MG tablet     Sig: Take by mouth.     Dispense:  1 kit     Refill:  0    famotidine (PEPCID) 20 MG tablet     Sig: Take 1 tablet by mouth 2 times daily     Dispense:  60 tablet     Refill:  3        [id number] [address] - P [phone] - F [phone]  [address] [address]  Phone: [phone] Fax: [phone]       Patient Instructions     Orders Placed This Encounter   Procedures    External Referral To Rheumatology     Referral Priority:   Routine     Referral Type:   Eval and Treat     Referral Reason:   Specialty Services Required     Referred to Provider:   Francoise Palacios MD     Requested Specialty:   Rheumatology     Number of Visits Requested:   1        I have referred you, to    Referred to Provider:   Philip

## 2025-06-05 ENCOUNTER — OFFICE VISIT (OUTPATIENT)
Age: 75
End: 2025-06-05
Payer: MEDICARE

## 2025-06-05 DIAGNOSIS — M10.071 ACUTE IDIOPATHIC GOUT OF RIGHT FOOT: Primary | ICD-10-CM

## 2025-06-05 PROCEDURE — 4004F PT TOBACCO SCREEN RCVD TLK: CPT | Performed by: ORTHOPAEDIC SURGERY

## 2025-06-05 PROCEDURE — 1159F MED LIST DOCD IN RCRD: CPT | Performed by: ORTHOPAEDIC SURGERY

## 2025-06-05 PROCEDURE — 1123F ACP DISCUSS/DSCN MKR DOCD: CPT | Performed by: ORTHOPAEDIC SURGERY

## 2025-06-05 PROCEDURE — 1125F AMNT PAIN NOTED PAIN PRSNT: CPT | Performed by: ORTHOPAEDIC SURGERY

## 2025-06-05 PROCEDURE — G8417 CALC BMI ABV UP PARAM F/U: HCPCS | Performed by: ORTHOPAEDIC SURGERY

## 2025-06-05 PROCEDURE — 1160F RVW MEDS BY RX/DR IN RCRD: CPT | Performed by: ORTHOPAEDIC SURGERY

## 2025-06-05 PROCEDURE — G8427 DOCREV CUR MEDS BY ELIG CLIN: HCPCS | Performed by: ORTHOPAEDIC SURGERY

## 2025-06-05 PROCEDURE — 3017F COLORECTAL CA SCREEN DOC REV: CPT | Performed by: ORTHOPAEDIC SURGERY

## 2025-06-05 PROCEDURE — 99213 OFFICE O/P EST LOW 20 MIN: CPT | Performed by: ORTHOPAEDIC SURGERY

## 2025-06-05 RX ORDER — METHYLPREDNISOLONE 4 MG/1
TABLET ORAL
Qty: 1 KIT | Refills: 0 | Status: SHIPPED | OUTPATIENT
Start: 2025-06-05 | End: 2025-06-11

## 2025-06-05 RX ORDER — FAMOTIDINE 20 MG/1
20 TABLET, FILM COATED ORAL 2 TIMES DAILY
Qty: 60 TABLET | Refills: 3 | Status: SHIPPED | OUTPATIENT
Start: 2025-06-05

## 2025-06-05 NOTE — PROGRESS NOTES
AMBULATORY PROGRESS NOTE      Patient: Noman Verma             MRN: [id number]     SSN: [id number] There is no height or weight on file to calculate BMI.  YOB: 1950     AGE: 74 y.o.       EX: male  OFFICE VISIT DATE OF SERVICE:  6/5/25     PCP: No primary care provider on file.       IMPRESSION //  DIAGNOSIS AND TREATMENT PLAN  6/5/2025       Noman Verma has a diagnosis of:      DIAGNOSES    1. Acute idiopathic gout of right foot          PLAN:    1. Referral to see Rheumatologist and Nephrologist for reoccurring gout.  2. Ordered repeat Medrol Dosepak, take as directed, Pepcid, 20 mg, 1 p.o. each day.   3. Advised to avoid certain foods group to prevent gout flares, such as seafood.   4. Recommended to research PCP's     RTO 4 weeks    Orders Placed This Encounter    [facility]     Referral Priority:   Routine     Referral Type:   Eval and Treat     Referral Reason:   Specialty Services Required     Referred to Provider:   Luiz Chua MD     Requested Specialty:   Family Medicine     Number of Visits Requested:   1    methylPREDNISolone (MEDROL DOSEPACK) 4 MG tablet     Sig: Take as prescribed.     Dispense:  1 kit     Refill:  0    famotidine (PEPCID) 20 MG tablet     Sig: Take 1 tablet by mouth 2 times daily     Dispense:  60 tablet     Refill:  3        [id number] - [facility] [address] - P [phone] - F [phone]  [address] [address]  Phone: [phone] Fax: [phone]       There are no Patient Instructions on file for this visit.      Please follow up with your PCP for any health maintenance as recommended.       Noman Verma  expresses understanding of the diagnosis, treatment plan, and all of their proposed questions were answered to their satisfaction. Patient education has been provided re the diagnoses.       HPI //  OBJECTIVE EXAMINATION      Noman Verma IS A 74 y.o.

## 2025-07-30 ENCOUNTER — HOSPITAL ENCOUNTER (OUTPATIENT)
Age: 75
Discharge: HOME | End: 2025-07-30
Payer: MEDICARE

## 2025-07-30 ENCOUNTER — OFFICE VISIT (OUTPATIENT)
Facility: CLINIC | Age: 75
End: 2025-07-30
Payer: MEDICARE

## 2025-07-30 VITALS
SYSTOLIC BLOOD PRESSURE: 148 MMHG | OXYGEN SATURATION: 94 % | RESPIRATION RATE: 16 BRPM | HEART RATE: 74 BPM | BODY MASS INDEX: 29.96 KG/M2 | HEIGHT: 71 IN | DIASTOLIC BLOOD PRESSURE: 88 MMHG | WEIGHT: 214 LBS | TEMPERATURE: 98.2 F

## 2025-07-30 DIAGNOSIS — R03.0 ELEVATED BP WITHOUT DIAGNOSIS OF HYPERTENSION: ICD-10-CM

## 2025-07-30 DIAGNOSIS — Z87.39 HISTORY OF GOUT: ICD-10-CM

## 2025-07-30 DIAGNOSIS — Z11.59 ENCOUNTER FOR HEPATITIS C SCREENING TEST FOR LOW RISK PATIENT: ICD-10-CM

## 2025-07-30 DIAGNOSIS — N28.9 RENAL INSUFFICIENCY: ICD-10-CM

## 2025-07-30 DIAGNOSIS — J44.9 CHRONIC OBSTRUCTIVE PULMONARY DISEASE, UNSPECIFIED COPD TYPE (HCC): ICD-10-CM

## 2025-07-30 DIAGNOSIS — R07.9 CHEST PAIN, UNSPECIFIED TYPE: ICD-10-CM

## 2025-07-30 DIAGNOSIS — Z76.89 ENCOUNTER TO ESTABLISH CARE: Primary | ICD-10-CM

## 2025-07-30 DIAGNOSIS — Z13.228 SCREENING FOR METABOLIC DISORDER: ICD-10-CM

## 2025-07-30 DIAGNOSIS — K08.9 POOR DENTITION: ICD-10-CM

## 2025-07-30 DIAGNOSIS — K21.9 GASTROESOPHAGEAL REFLUX DISEASE WITHOUT ESOPHAGITIS: ICD-10-CM

## 2025-07-30 DIAGNOSIS — Z12.11 COLON CANCER SCREENING: ICD-10-CM

## 2025-07-30 DIAGNOSIS — Z13.6 SCREENING FOR AAA (AORTIC ABDOMINAL ANEURYSM): ICD-10-CM

## 2025-07-30 DIAGNOSIS — G57.93 NEUROPATHY OF BOTH FEET: ICD-10-CM

## 2025-07-30 PROBLEM — M10.9 ACUTE GOUT: Status: RESOLVED | Noted: 2025-07-30 | Resolved: 2025-07-30

## 2025-07-30 LAB
ALBUMIN SERPL-MCNC: 3.5 G/DL (ref 3.4–5)
ALBUMIN/GLOB SERPL: 1 (ref 0.8–1.7)
ALP SERPL-CCNC: 44 U/L (ref 45–117)
ALT SERPL-CCNC: 21 U/L (ref 10–50)
ANION GAP SERPL CALC-SCNC: 8 MMOL/L (ref 3–18)
AST SERPL-CCNC: 23 U/L (ref 10–38)
BASOPHILS # BLD: 0.05 K/UL (ref 0–0.1)
BASOPHILS NFR BLD: 0.8 % (ref 0–2)
BILIRUB SERPL-MCNC: 0.3 MG/DL (ref 0.2–1)
BUN SERPL-MCNC: 26 MG/DL (ref 6–23)
BUN/CREAT SERPL: 13 (ref 12–20)
CALCIUM SERPL-MCNC: 9.8 MG/DL (ref 8.5–10.1)
CHLORIDE SERPL-SCNC: 106 MMOL/L (ref 98–107)
CHOLEST SERPL-MCNC: 152 MG/DL
CO2 SERPL-SCNC: 24 MMOL/L (ref 21–32)
CREAT SERPL-MCNC: 1.92 MG/DL (ref 0.6–1.3)
DIFFERENTIAL METHOD BLD: ABNORMAL
EOSINOPHIL # BLD: 0.33 K/UL (ref 0–0.4)
EOSINOPHIL NFR BLD: 5.6 % (ref 0–5)
ERYTHROCYTE [DISTWIDTH] IN BLOOD BY AUTOMATED COUNT: 17 % (ref 11.6–14.5)
EST. AVERAGE GLUCOSE BLD GHB EST-MCNC: 139 MG/DL
GLOBULIN SER CALC-MCNC: 3.3 G/DL (ref 2–4)
GLUCOSE SERPL-MCNC: 107 MG/DL (ref 74–108)
HBA1C MFR BLD: 6.5 % (ref 4.2–5.6)
HCT VFR BLD AUTO: 45.6 % (ref 36–48)
HDLC SERPL-MCNC: 28 MG/DL (ref 40–60)
HDLC SERPL: 5.4 (ref 0–5)
HGB BLD-MCNC: 14.1 G/DL (ref 13–16)
IMM GRANULOCYTES # BLD AUTO: 0.01 K/UL (ref 0–0.04)
IMM GRANULOCYTES NFR BLD AUTO: 0.2 % (ref 0–0.5)
LDLC SERPL CALC-MCNC: 75 MG/DL (ref 0–100)
LYMPHOCYTES # BLD: 2.53 K/UL (ref 0.9–3.6)
LYMPHOCYTES NFR BLD: 42.9 % (ref 21–52)
MCH RBC QN AUTO: 27.3 PG (ref 24–34)
MCHC RBC AUTO-ENTMCNC: 30.9 G/DL (ref 31–37)
MCV RBC AUTO: 88.2 FL (ref 78–100)
MONOCYTES # BLD: 0.81 K/UL (ref 0.05–1.2)
MONOCYTES NFR BLD: 13.7 % (ref 3–10)
NEUTS SEG # BLD: 2.17 K/UL (ref 1.8–8)
NEUTS SEG NFR BLD: 36.8 % (ref 40–73)
NRBC # BLD: 0 K/UL (ref 0–0.01)
NRBC BLD-RTO: 0 PER 100 WBC
PLATELET # BLD AUTO: 375 K/UL (ref 135–420)
PMV BLD AUTO: 9.9 FL (ref 9.2–11.8)
POTASSIUM SERPL-SCNC: 5 MMOL/L (ref 3.5–5.5)
PROT SERPL-MCNC: 6.8 G/DL (ref 6.4–8.2)
RBC # BLD AUTO: 5.17 M/UL (ref 4.35–5.65)
SODIUM SERPL-SCNC: 138 MMOL/L (ref 136–145)
TRIGL SERPL-MCNC: 242 MG/DL (ref 0–150)
URATE SERPL-MCNC: 10.5 MG/DL (ref 2.6–7.2)
VLDLC SERPL CALC-MCNC: 48 MG/DL
WBC # BLD AUTO: 5.9 K/UL (ref 4.6–13.2)

## 2025-07-30 PROCEDURE — 99214 OFFICE O/P EST MOD 30 MIN: CPT | Performed by: FAMILY MEDICINE

## 2025-07-30 PROCEDURE — 1123F ACP DISCUSS/DSCN MKR DOCD: CPT | Performed by: FAMILY MEDICINE

## 2025-07-30 PROCEDURE — 36415 COLL VENOUS BLD VENIPUNCTURE: CPT

## 2025-07-30 PROCEDURE — 1125F AMNT PAIN NOTED PAIN PRSNT: CPT | Performed by: FAMILY MEDICINE

## 2025-07-30 PROCEDURE — 80053 COMPREHEN METABOLIC PANEL: CPT

## 2025-07-30 PROCEDURE — 1160F RVW MEDS BY RX/DR IN RCRD: CPT | Performed by: FAMILY MEDICINE

## 2025-07-30 PROCEDURE — 1159F MED LIST DOCD IN RCRD: CPT | Performed by: FAMILY MEDICINE

## 2025-07-30 PROCEDURE — 84550 ASSAY OF BLOOD/URIC ACID: CPT

## 2025-07-30 PROCEDURE — G8417 CALC BMI ABV UP PARAM F/U: HCPCS | Performed by: FAMILY MEDICINE

## 2025-07-30 PROCEDURE — 85025 COMPLETE CBC W/AUTO DIFF WBC: CPT

## 2025-07-30 PROCEDURE — 3017F COLORECTAL CA SCREEN DOC REV: CPT | Performed by: FAMILY MEDICINE

## 2025-07-30 PROCEDURE — 4004F PT TOBACCO SCREEN RCVD TLK: CPT | Performed by: FAMILY MEDICINE

## 2025-07-30 PROCEDURE — 80061 LIPID PANEL: CPT

## 2025-07-30 PROCEDURE — 86803 HEPATITIS C AB TEST: CPT

## 2025-07-30 PROCEDURE — 83036 HEMOGLOBIN GLYCOSYLATED A1C: CPT

## 2025-07-30 PROCEDURE — G8427 DOCREV CUR MEDS BY ELIG CLIN: HCPCS | Performed by: FAMILY MEDICINE

## 2025-07-30 PROCEDURE — 3023F SPIROM DOC REV: CPT | Performed by: FAMILY MEDICINE

## 2025-07-30 RX ORDER — GABAPENTIN 100 MG/1
100 CAPSULE ORAL NIGHTLY
Qty: 90 CAPSULE | Refills: 0 | Status: SHIPPED | OUTPATIENT
Start: 2025-07-30 | End: 2025-10-28

## 2025-07-30 RX ORDER — ALBUTEROL SULFATE 90 UG/1
2 INHALANT RESPIRATORY (INHALATION) 4 TIMES DAILY PRN
Qty: 18 G | Refills: 0 | Status: SHIPPED | OUTPATIENT
Start: 2025-07-30

## 2025-07-30 RX ORDER — FAMOTIDINE 20 MG/1
20 TABLET, FILM COATED ORAL DAILY
Qty: 90 TABLET | Refills: 1 | Status: SHIPPED | OUTPATIENT
Start: 2025-07-30

## 2025-07-30 RX ORDER — FLUTICASONE FUROATE, UMECLIDINIUM BROMIDE AND VILANTEROL TRIFENATATE 100; 62.5; 25 UG/1; UG/1; UG/1
1 POWDER RESPIRATORY (INHALATION) DAILY
Qty: 60 EACH | Refills: 3 | Status: SHIPPED | OUTPATIENT
Start: 2025-07-30

## 2025-07-30 SDOH — ECONOMIC STABILITY: FOOD INSECURITY: WITHIN THE PAST 12 MONTHS, YOU WORRIED THAT YOUR FOOD WOULD RUN OUT BEFORE YOU GOT MONEY TO BUY MORE.: SOMETIMES TRUE

## 2025-07-30 SDOH — ECONOMIC STABILITY: FOOD INSECURITY: WITHIN THE PAST 12 MONTHS, THE FOOD YOU BOUGHT JUST DIDN'T LAST AND YOU DIDN'T HAVE MONEY TO GET MORE.: SOMETIMES TRUE

## 2025-07-30 ASSESSMENT — PATIENT HEALTH QUESTIONNAIRE - PHQ9
SUM OF ALL RESPONSES TO PHQ QUESTIONS 1-9: 0
1. LITTLE INTEREST OR PLEASURE IN DOING THINGS: NOT AT ALL
2. FEELING DOWN, DEPRESSED OR HOPELESS: NOT AT ALL

## 2025-07-30 ASSESSMENT — ENCOUNTER SYMPTOMS
CHEST TIGHTNESS: 0
ABDOMINAL PAIN: 0
COUGH: 0
DIARRHEA: 0
EYE REDNESS: 0
SHORTNESS OF BREATH: 1
WHEEZING: 1

## 2025-07-30 NOTE — PATIENT INSTRUCTIONS
[facility] [place] [address] Phone: [phone]     [facility], [address], [place] [address] [phone]      Patient Education        DASH Diet: Care Instructions  Your Care Instructions     The DASH diet is an eating plan that can help lower your blood pressure. DASH stands for Dietary Approaches to Stop Hypertension. Hypertension is high blood pressure.  The DASH diet focuses on eating foods that are high in calcium, potassium, and magnesium. These nutrients can lower blood pressure. The foods that are highest in these nutrients are fruits, vegetables, low-fat dairy products, nuts, seeds, and legumes. But taking calcium, potassium, and magnesium supplements instead of eating foods that are high in those nutrients does not have the same effect. The DASH diet also includes whole grains, fish, and poultry.  The DASH diet is one of several lifestyle changes your doctor may recommend to lower your high blood pressure. Your doctor may also want you to decrease the amount of sodium in your diet. Lowering sodium while following the DASH diet can lower blood pressure even further than just the DASH diet alone.  Follow-up care is a key part of your treatment and safety. Be sure to make and go to all appointments, and call your doctor if you are having problems. It's also a good idea to know your test results and keep a list of the medicines you take.  How can you care for yourself at home?  Following the DASH diet  Eat 4 to 5 servings of fruit each day. A serving is 1 medium-sized piece of fruit, 1/2 cup raw or canned fruit, 1/4 cup dried fruit, or 4 ounces (1/2 cup) of fruit juice. Choose fruit more often than fruit juice.  Eat 4 to 5 servings of vegetables each day. A serving is 1 cup of lettuce or raw leafy vegetables, 1/2 cup of chopped or cooked vegetables, or 4 ounces (1/2 cup) of vegetable juice. Choose vegetables more often than vegetable juice.  Get 2 to 3

## 2025-07-30 NOTE — PROGRESS NOTES
[facility]  Establish care visit   2025    Noman Verma (: 1950) is a 74 y.o. male, here to establish care.    Chief Complaint   Patient presents with    Establish Care    Gout        ASSESSMENT/ PLAN  1. Encounter to establish care  VS reviewed     BMI reviewed   Appropriate healthy lifestyle modifications discussed.  All questions answered.   F/u discussed.    2. History of gout  2-3 flares per year.  Seems controlled.  Check uric acid level to see if allopurinol would be helpful.  Also check renal function before hand  - Uric Acid; Future    3. Neuropathy of both feet  Uncontrolled.  Trial Neurontin.  Follow-up in 3 months to gauge improvement  - gabapentin (NEURONTIN) 100 MG capsule; Take 1 capsule by mouth nightly for 90 days. Max Daily Amount: 100 mg  Dispense: 90 capsule; Refill: 0    4. Poor dentition  Referral placed to dentistry for evaluation and treatment  - External Referral To Dentistry    5. Chronic obstructive pulmonary disease, unspecified COPD type (HCC)  Uncontrolled.  Start controller as well as rescue.  Follow-up in 3 months to gauge improvement.  Recommended smoking cessation.  - albuterol sulfate HFA (VENTOLIN HFA) 108 (90 Base) MCG/ACT inhaler; Inhale 2 puffs into the lungs 4 times daily as needed for Wheezing  Dispense: 18 g; Refill: 0  - fluticasone-umeclidin-vilant (TRELEGY ELLIPTA) 100-62.5-25 MCG/ACT AEPB inhaler; Inhale 1 puff into the lungs daily  Dispense: 60 each; Refill: 3    6. Chest pain, unspecified type  Unclear etiology, atypical.  May be due to COPD or acid reflux.  Check EKG for ischemic changes.  - EKG 12 Lead    7. Elevated BP without diagnosis of hypertension  Follow-up in 3 months to gauge improvement with DASH diet recommendations.    8. Renal insufficiency  Update renal function and needs to establish with nephro per Ortho referral.  Follow-up in 3 months to trend.  - Comprehensive Metabolic Panel; Future  - CBC with Auto Differential;

## 2025-07-31 LAB — HCV AB SER QL: NONREACTIVE

## 2025-08-22 LAB
EKG ATRIAL RATE: 82 BPM
EKG DIAGNOSIS: NORMAL
EKG P AXIS: 43 DEGREES
EKG P-R INTERVAL: 168 MS
EKG Q-T INTERVAL: 344 MS
EKG QRS DURATION: 66 MS
EKG QTC CALCULATION (BAZETT): 401 MS
EKG R AXIS: -23 DEGREES
EKG T AXIS: 10 DEGREES
EKG VENTRICULAR RATE: 82 BPM

## (undated) DEVICE — ARM DRAPE

## (undated) DEVICE — NEEDLE SYR 18GA L1.5IN RED PLAS HUB S STL BLNT FILL W/O

## (undated) DEVICE — ELECTRODE PT RET AD L9FT HI MOIST COND ADH HYDRGEL CORDED

## (undated) DEVICE — APPLICATOR MEDICATED 26 CC SOLUTION HI LT ORNG CHLORAPREP

## (undated) DEVICE — SYRINGE MED 10ML LUERLOCK TIP W/O SFTY DISP

## (undated) DEVICE — TIP COVER ACCESSORY

## (undated) DEVICE — DRAPE TOWEL: Brand: CONVERTORS

## (undated) DEVICE — LIQUIBAND RAPID ADHESIVE 36/CS 0.8ML: Brand: MEDLINE

## (undated) DEVICE — SUTURE MCRYL SZ 4-0 L18IN ABSRB UD L19MM PS-2 3/8 CIR PRIM Y496G

## (undated) DEVICE — Z DISC USE 2764362 SEAL ENDOSCP INSTR DIA5-8MM UNIV FOR CANN DA VINCI XI

## (undated) DEVICE — BLADELESS OBTURATOR: Brand: WECK VISTA

## (undated) DEVICE — ELECTRO LUBE IS A SINGLE PATIENT USE DEVICE THAT IS INTENDED TO BE USED ON ELECTROSURGICAL ELECTRODES TO REDUCE STICKING.: Brand: KEY SURGICAL ELECTRO LUBE

## (undated) DEVICE — Device

## (undated) DEVICE — SUTURE VLOC 90 2/0 VL 6 GS-22 VLOCM2105

## (undated) DEVICE — SYRINGE MED 30ML STD CLR PLAS LUERLOCK TIP N CTRL DISP

## (undated) DEVICE — COLUMN DRAPE

## (undated) DEVICE — STERILE POLYISOPRENE POWDER-FREE SURGICAL GLOVES: Brand: PROTEXIS

## (undated) DEVICE — SOLUTION IRRIG 1000ML 0.9% SOD CHL USP POUR PLAS BTL

## (undated) DEVICE — SUTURE VCRL SZ 2-0 L27IN ABSRB UD L26MM SH 1/2 CIR J417H